# Patient Record
Sex: FEMALE | Race: WHITE | Employment: OTHER | URBAN - METROPOLITAN AREA
[De-identification: names, ages, dates, MRNs, and addresses within clinical notes are randomized per-mention and may not be internally consistent; named-entity substitution may affect disease eponyms.]

---

## 2019-05-06 ENCOUNTER — HOSPITAL ENCOUNTER (EMERGENCY)
Age: 74
Discharge: HOME | End: 2019-05-06
Attending: EMERGENCY MEDICINE
Payer: MEDICARE

## 2019-05-06 VITALS
SYSTOLIC BLOOD PRESSURE: 132 MMHG | OXYGEN SATURATION: 97 % | TEMPERATURE: 97.2 F | DIASTOLIC BLOOD PRESSURE: 64 MMHG | RESPIRATION RATE: 14 BRPM | HEART RATE: 76 BPM

## 2019-05-06 DIAGNOSIS — R41.82 ALTERED MENTAL STATUS, UNSPECIFIED ALTERED MENTAL STATUS TYPE: Primary | ICD-10-CM

## 2019-05-06 PROCEDURE — 99281 EMR DPT VST MAYX REQ PHY/QHP: CPT

## 2019-05-06 NOTE — PROGRESS NOTES
Medication History  [facility]    Patient Name: Julissa Nava 1945     Medication history has been completed by: Claude Macario CPhT    Source(s) of information: Patients spouse and Insurance claims    Primary Care Physician: Sravan Melissa MD     Pharmacy: [facility] [address]    Allergies as of 05/06/2019    (No Known Allergies)        Prior to Admission medications    Medication Sig Start Date End Date Taking?  Authorizing Provider   levothyroxine (SYNTHROID) 75 MCG tablet Take 75 mcg by mouth daily     Historical Provider, MD             Medications removed from list (include reason, ex. noncompliance, medication cost, therapy complete etc.):   · Tylenol no longer taking  · Docusate no longer taking  · Multivitamin no longer taking  · Red yeast rice no longer taking  · Xarelto no longer taking    Other Comments:  · Reviewed and updated med list per patients  as patient has dementia  · Spoke with patients niece who states that patient was taking aricept for her dementia however spouse stopped giving her this medication as he felt it was not working    To my knowledge the above medication history is accurate as of 5/6/2019 6:10 PM.   Claude Macario CPhT   5/6/2019 6:10 PM

## 2019-05-06 NOTE — ED PROVIDER NOTES
Triage Chief Complaint:   Altered Mental Status (Pt alert but not oriented)    False PassYoon Wade is a 66 y.o. female that presents to the ED with alzheimer's disease. Patient primarily under the care of her  who is being admitted to the hospital. She has no complaints. At the bedside she is alert    ROS:  Alert and oriented. unreliable historian secondary to dementia. Past Medical History:   Diagnosis Date    Dementia     Thyroid disease      Past Surgical History:   Procedure Laterality Date    HIP SURGERY Left 06/29/2016    Left hip hemiarthroplasty    THYROIDECTOMY       History reviewed. No pertinent family history.   Social History     Socioeconomic History    Marital status:      Spouse name: Not on file    Number of children: Not on file    Years of education: Not on file    Highest education level: Not on file   Occupational History    Not on file   Social Needs    Financial resource strain: Not on file    Food insecurity:     Worry: Not on file     Inability: Not on file    Transportation needs:     Medical: Not on file     Non-medical: Not on file   Tobacco Use    Smoking status: Never Smoker   Substance and Sexual Activity    Alcohol use: No    Drug use: No    Sexual activity: Not on file   Lifestyle    Physical activity:     Days per week: Not on file     Minutes per session: Not on file    Stress: Not on file   Relationships    Social connections:     Talks on phone: Not on file     Gets together: Not on file     Attends Caodaism service: Not on file     Active member of club or organization: Not on file     Attends meetings of clubs or organizations: Not on file     Relationship status: Not on file    Intimate partner violence:     Fear of current or ex partner: Not on file     Emotionally abused: Not on file     Physically abused: Not on file     Forced sexual activity: Not on file   Other Topics Concern    Not on file   Social History Narrative    Not on file     No current facility-administered medications for this encounter. Current Outpatient Medications   Medication Sig Dispense Refill    levothyroxine (SYNTHROID) 75 MCG tablet Take 75 mcg by mouth daily        No Known Allergies    Nursing Notes Reviewed    Physical Exam:  ED Triage Vitals [05/06/19 1753]   Enc Vitals Group      /64      Pulse 76      Resp 14      Temp 97.2 °F (36.2 °C)      Temp Source Oral      SpO2 97 %      Weight       Height       Head Circumference       Peak Flow       Pain Score       Pain Loc       Pain Edu? Excl. in [address]? My pulse ox interpretation is - normal    General appearance:  No acute distress. Skin:  Warm. Dry. Eye:  Extraocular movements intact. Ears, nose, mouth and throat:  Oral mucosa moist   Neck:  Trachea midline. Extremity:  No swelling. Normal ROM     Heart:  Regular rate and rhythm, normal S1 & S2, no extra heart sounds. Perfusion:  intact  Respiratory:  Lungs clear to auscultation bilaterally. Respirations nonlabored. Abdominal:  Normal bowel sounds. Soft. Nontender. Non distended. Neurological:  Alert. No focal neuro deficits. I have reviewed and interpreted all of the currently available lab results from this visit (if applicable):  No results found for this visit on 05/06/19. Radiographs (if obtained):  [] The following radiograph was interpreted by myself in the absence of a radiologist:   [] Radiologist's Report Reviewed:  No orders to display         EKG (if obtained): (All EKG's are interpreted by myself in the absence of a cardiologist)    Chart review shows recent radiographs:  No results found. MDM:  52-year-old female presenting with ongoing dementia however no new symptoms reported. Given that patient is under the care of her  who is being admitted it is deemed necessary for patient to go to respite care. Patient accepted at [facility].  During her time in the ED she remained stable and in no acute distress. Clinical Impression:  1. Altered mental status, unspecified altered mental status type      Disposition referral (if applicable):  Joyce Melissa MD  [facility]  [phone]    Schedule an appointment as soon as possible for a visit       Disposition medications (if applicable):  New Prescriptions    No medications on file       Comment: Please note this report has been produced using speech recognition software and may contain errors related to that system including errors in grammar, punctuation, and spelling, as well as words and phrases that may be inappropriate. If there are any questions or concerns please feel free to contact the dictating provider for clarification.         Greta Gan MD  05/06/19 8469

## 2019-05-06 NOTE — CARE COORDINATION
CM consult to place pt in respite care while  is being admitted and is her care giver. Pt  is willing to private pay for pt to be in respite care. Call to [facility] respite spoke with Mary Romero [phone], not sure if available room for female will have to check and call back. Call to [facility], [address], she states they do have a bed, facility is locked and can accept pt. Fax # [phone]. Spoke with Madai Almendarez,  is is ok with the cost of $143/day. Update to Dr Alyssa Ordoñez, she signed order for pt to go to [facility]. Call Back to [facility] to update, left message on cell # [phone].  NGUYỄN GARCIA/CM

## 2019-05-18 ENCOUNTER — HOSPITAL ENCOUNTER (OUTPATIENT)
Age: 74
Setting detail: SPECIMEN
Discharge: HOME | End: 2019-05-18
Payer: MEDICARE

## 2019-05-18 PROCEDURE — 87086 URINE CULTURE/COLONY COUNT: CPT

## 2019-05-18 PROCEDURE — 81001 URINALYSIS AUTO W/SCOPE: CPT

## 2019-05-20 LAB
BACTERIA: NEGATIVE /HPF
BILIRUBIN URINE: NEGATIVE MG/DL
BLOOD, URINE: NEGATIVE
CLARITY: CLEAR
COLOR: YELLOW
GLUCOSE, URINE: NEGATIVE MG/DL
KETONES, URINE: NEGATIVE MG/DL
LEUKOCYTE ESTERASE, URINE: NEGATIVE
NITRITE URINE, QUANTITATIVE: NEGATIVE
PH, URINE: 6 (ref 5–8)
PROTEIN UA: NEGATIVE MG/DL
RBC URINE: <1 /HPF (ref 0–6)
SPECIFIC GRAVITY UA: 1.02 (ref 1–1.03)
SQUAMOUS EPITHELIAL: <1 /HPF
TRICHOMONAS: NORMAL /HPF
UROBILINOGEN, URINE: NORMAL MG/DL (ref 0.2–1)
WBC UA: 1 /HPF (ref 0–5)

## 2019-05-21 LAB
CULTURE: ABNORMAL
Lab: ABNORMAL
SPECIMEN: ABNORMAL
TOTAL COLONY COUNT: ABNORMAL

## 2019-07-27 ENCOUNTER — APPOINTMENT (OUTPATIENT)
Dept: GENERAL RADIOLOGY | Age: 74
End: 2019-07-27
Payer: MEDICARE

## 2019-07-27 ENCOUNTER — HOSPITAL ENCOUNTER (EMERGENCY)
Age: 74
Discharge: HOME | End: 2019-07-27
Attending: EMERGENCY MEDICINE
Payer: MEDICARE

## 2019-07-27 ENCOUNTER — APPOINTMENT (OUTPATIENT)
Dept: CT IMAGING | Age: 74
End: 2019-07-27
Payer: MEDICARE

## 2019-07-27 VITALS
RESPIRATION RATE: 24 BRPM | WEIGHT: 170 LBS | HEART RATE: 58 BPM | HEIGHT: 67 IN | SYSTOLIC BLOOD PRESSURE: 112 MMHG | TEMPERATURE: 97.7 F | DIASTOLIC BLOOD PRESSURE: 45 MMHG | BODY MASS INDEX: 26.68 KG/M2 | OXYGEN SATURATION: 99 %

## 2019-07-27 DIAGNOSIS — S59.201A DISPLACED PHYSEAL FRACTURE OF DISTAL END OF RIGHT RADIUS, INITIAL ENCOUNTER: Primary | ICD-10-CM

## 2019-07-27 PROCEDURE — 90715 TDAP VACCINE 7 YRS/> IM: CPT | Performed by: PHYSICIAN ASSISTANT

## 2019-07-27 PROCEDURE — 70450 CT HEAD/BRAIN W/O DYE: CPT

## 2019-07-27 PROCEDURE — 73130 X-RAY EXAM OF HAND: CPT

## 2019-07-27 PROCEDURE — 72125 CT NECK SPINE W/O DYE: CPT

## 2019-07-27 PROCEDURE — 90471 IMMUNIZATION ADMIN: CPT | Performed by: PHYSICIAN ASSISTANT

## 2019-07-27 PROCEDURE — 2500000003 HC RX 250 WO HCPCS: Performed by: PHYSICIAN ASSISTANT

## 2019-07-27 PROCEDURE — 6360000002 HC RX W HCPCS: Performed by: PHYSICIAN ASSISTANT

## 2019-07-27 PROCEDURE — 4500000028 HC INTERMEDIATE PROCEDURE

## 2019-07-27 PROCEDURE — 99284 EMERGENCY DEPT VISIT MOD MDM: CPT

## 2019-07-27 PROCEDURE — 6370000000 HC RX 637 (ALT 250 FOR IP): Performed by: PHYSICIAN ASSISTANT

## 2019-07-27 PROCEDURE — 73110 X-RAY EXAM OF WRIST: CPT

## 2019-07-27 RX ORDER — DIAPER,BRIEF,INFANT-TODD,DISP
EACH MISCELLANEOUS ONCE
Status: COMPLETED | OUTPATIENT
Start: 2019-07-27 | End: 2019-07-27

## 2019-07-27 RX ORDER — HYDROCODONE BITARTRATE AND ACETAMINOPHEN 5; 325 MG/1; MG/1
1 TABLET ORAL ONCE
Status: COMPLETED | OUTPATIENT
Start: 2019-07-27 | End: 2019-07-27

## 2019-07-27 RX ORDER — BUPIVACAINE HYDROCHLORIDE AND EPINEPHRINE 5; .0091 MG/ML; MG/ML
10 INJECTION, SOLUTION DENTAL; INFILTRATION ONCE
Status: COMPLETED | OUTPATIENT
Start: 2019-07-27 | End: 2019-07-27

## 2019-07-27 RX ORDER — HYDROCODONE BITARTRATE AND ACETAMINOPHEN 5; 325 MG/1; MG/1
1 TABLET ORAL EVERY 6 HOURS PRN
Qty: 8 TABLET | Refills: 0 | Status: SHIPPED | OUTPATIENT
Start: 2019-07-27 | End: 2019-07-30

## 2019-07-27 RX ADMIN — HYDROCODONE BITARTRATE AND ACETAMINOPHEN 1 TABLET: 5; 325 TABLET ORAL at 16:07

## 2019-07-27 RX ADMIN — BACITRACIN ZINC 1 G: 500 OINTMENT TOPICAL at 16:07

## 2019-07-27 RX ADMIN — TETANUS TOXOID, REDUCED DIPHTHERIA TOXOID AND ACELLULAR PERTUSSIS VACCINE, ADSORBED 0.5 ML: 5; 2.5; 8; 8; 2.5 SUSPENSION INTRAMUSCULAR at 16:07

## 2019-07-27 RX ADMIN — BUPIVACAINE HYDROCHLORIDE AND EPINEPHRINE BITARTRATE 10 ML: 5; .005 INJECTION, SOLUTION EPIDURAL; INTRACAUDAL; PERINEURAL at 17:30

## 2019-07-27 ASSESSMENT — PAIN SCALES - PAIN ASSESSMENT IN ADVANCED DEMENTIA (PAINAD)
BODYLANGUAGE: 1
BREATHING: 1
CONSOLABILITY: 1
FACIALEXPRESSION: 1
TOTALSCORE: 5
NEGVOCALIZATION: 1

## 2019-07-27 ASSESSMENT — PAIN SCALES - GENERAL
PAINLEVEL_OUTOF10: 8
PAINLEVEL_OUTOF10: 8

## 2019-07-27 NOTE — ED PROVIDER NOTES
Patient Identification  Jason Hassan is a 66 y.o. female    Chief Complaint  Fall (tripped) and Hand Injury (right)      HPI  (History provided by rufina)  This is a 66 y.o. female with history of Alzheimer's who was brought in by  for chief complaint of fall, right hand and wrist injury. Just prior to arrival patient tripped over a parking block and injured her right wrist and hand.  also states that she hit her head but does not think she had a very hard. She did no loss of consciousness. She is obvious deformity to the right wrist and does complain of pain in this area, unable to give me a severity, denies any other areas of pain although history is difficult to obtain secondary to Alzheimer's. She is not on blood thinners. No vomiting. Has been able to ambulate since injury. REVIEW OF SYSTEMS    Partial review of systems performed as noted above, unable to obtain further review of systems secondary to patient's Alzheimer's. See HPI and nursing notes for additional information     I have reviewed the following nursing documentation:  Allergies: No Known Allergies    Past medical history:  has a past medical history of Dementia and Thyroid disease. Past surgical history:  has a past surgical history that includes Thyroidectomy and hip surgery (Left, 06/29/2016). Home medications:   Prior to Admission medications    Medication Sig Start Date End Date Taking? Authorizing Provider   HYDROcodone-acetaminophen (NORCO) 5-325 MG per tablet Take 1 tablet by mouth every 6 hours as needed for Pain for up to 3 days. 7/27/19 7/30/19 Yes Peterson Mora PA-C   levothyroxine (SYNTHROID) 75 MCG tablet Take 75 mcg by mouth daily     Historical Provider, MD       Social history:  reports that she has never smoked. She has never used smokeless tobacco. She reports that she does not drink alcohol or use drugs. Family history:  History reviewed.  No pertinent family small vessel ischemic white matter disease and diffuse cerebral   volume loss. Low-density extra-axial collections bilaterally are most compatible with   chronic subdural hygromas. Labs  No results found for this visit on 07/27/19. MDM  Patient presents for wrist deformity, fall. Found to have displaced impacted right distal radius fracture. Did hit her head, CT head neck shows no abnormalities. Performed hematoma block as noted above the patient was moving for large portions of procedure, unclear how much of anesthetic was placed into hematoma. Attempted mild traction prior to splinting and patient did not tolerate this at all. Patient splinted with fracture as it stands. Will refer to orthopedics. Discussed splint care with . Provided Norco for pain. Discussed splint care and neurovascular checks. Plan is to discharge. Recommended calling orthopedics on Monday for earliest appointment. Assessment and plan discussed with  understands and agrees. Recommended returning patient to ED for any new or worsening symptoms including worsening pain, skin color changes, numbness, weakness. I have independently evaluated this patient. Final Impression  1. Displaced physeal fracture of distal end of right radius, initial encounter        Blood pressure (!) 112/45, pulse 58, temperature 97.7 °F (36.5 °C), temperature source Oral, resp. rate 24, height 5' 7\" (1.702 m), weight 170 lb (77.1 kg), SpO2 99 %. Disposition:  Discharge to home in stable condition. Patient was given scripts for the following medications. I counseled patient how to take these medications. New Prescriptions    HYDROCODONE-ACETAMINOPHEN (NORCO) 5-325 MG PER TABLET    Take 1 tablet by mouth every 6 hours as needed for Pain for up to 3 days. This chart was generated using the [address] [place], [place] [address]  dictation system.  I created this record but it may contain dictation errors given the limitations of

## 2019-07-31 PROBLEM — S52.531A CLOSED COLLES' FRACTURE OF RIGHT RADIUS: Status: ACTIVE | Noted: 2019-07-31

## 2019-07-31 NOTE — PROGRESS NOTES
ORTHOPEDIC NEW PATIENT NOTE      2019    Patient name: Elio Alford  : 1945    CHIEF COMPLAINT  Chief Complaint   Patient presents with    Wrist Pain     right       HPI  The patient was seen and examined. Elio Alford is a 66 y.o. female who presents with complaints of right wrist pain after a trip over parking block causing a fall on outstretched hand. Patient has underlying Alzheimer's dementia. Date of injury 19. She was seen and evaluated in ED. X-rays confirm distal radius fracture, displaced, intra-articular. A hematoma block was performed in ED with reduction and splinting. No post-reduction films obtained. Severity 8/10  Character sharp and dull - patient poor historian,  with patient. He reports she has been removing splint and complains of pain. They are constantly replacing splint. Patient's  desires more permanent fixation. We have discussed treatment options in length and detail, expected recovery and restrictions. The  desires surgery and desires a cast today which the patient cannot remove before surgery. Cast was bivalved. PAST MEDICAL HISTORY  Past Medical History:   Diagnosis Date    Dementia     Thyroid disease        CURRENT MEDICATIONS  Prior to Admission medications    Medication Sig Start Date End Date Taking? Authorizing Provider   donepezil (ARICEPT) 10 MG tablet TAKE 1 TABLET BY MOUTH EVERY DAY AFTER 5MG 19   Historical Provider, MD   [facility] VITAMIN E 1000 units capsule TAKE ONE CAPSULE BY MOUTH EVERY DAY 19   Historical Provider, MD   levothyroxine (SYNTHROID) 75 MCG tablet Take 75 mcg by mouth daily     Historical Provider, MD       ALLERGIES  No Known Allergies    SURGICAL HISTORY  Past Surgical History:   Procedure Laterality Date    HIP SURGERY Left 2016    Left hip hemiarthroplasty    THYROIDECTOMY         FAMILY HISTORY  No family history on file.     SOCIAL HISTORY  Social History     Socioeconomic History    66 y.o. female with right acute displaced distal radius, intraarticular   Alzheimer's Dementia  PLAN   1. Discussed management options right distal radius fracture, operative vs conservative. Would recommend operative management based on alignment. Patient's  agrees as patient is unable to make own medical decisions with advanced Alzheimer's dementia. SURGICAL CONSENT  Diagnosis: right distal radius fracture   Planned Procedure: open reduction internal fixation right wrist    Diagnosis, planned procedure, pertinent risks, benefits, alternatives, potential complications, expectations, outcomes, etc. discussed with patient. Questions answered. The patient desires to proceed. Surgical consent signed. 2. Bivalved short arm cast  3. Non-weightbearing  4. Will work to schedule outpatient procedure.  Goal next week Tuesday 8/6/19  Follow up in approximately 3 weeks post-op        Electronically signed by: Monty Everett PA-C, 8/1/2019 2:24 PM

## 2019-08-01 ENCOUNTER — OFFICE VISIT (OUTPATIENT)
Dept: ORTHOPEDIC SURGERY | Age: 74
End: 2019-08-01
Payer: MEDICARE

## 2019-08-01 VITALS — RESPIRATION RATE: 14 BRPM | HEART RATE: 63 BPM | OXYGEN SATURATION: 98 %

## 2019-08-01 DIAGNOSIS — S52.531A CLOSED COLLES' FRACTURE OF RIGHT RADIUS, INITIAL ENCOUNTER: ICD-10-CM

## 2019-08-01 DIAGNOSIS — S52.531A CLOSED COLLES' FRACTURE OF RIGHT RADIUS, INITIAL ENCOUNTER: Primary | ICD-10-CM

## 2019-08-01 PROCEDURE — 99202 OFFICE O/P NEW SF 15 MIN: CPT | Performed by: PHYSICIAN ASSISTANT

## 2019-08-01 PROCEDURE — 29075 APPL CST ELBW FNGR SHORT ARM: CPT | Performed by: PHYSICIAN ASSISTANT

## 2019-08-01 RX ORDER — DONEPEZIL HYDROCHLORIDE 10 MG/1
10 TABLET, FILM COATED ORAL NIGHTLY
Refills: 11 | COMMUNITY
Start: 2019-05-20 | End: 2020-11-09

## 2019-08-01 RX ORDER — VITAMIN E 1000 UNIT
CAPSULE ORAL
Refills: 11 | COMMUNITY
Start: 2019-05-20 | End: 2020-11-09

## 2019-08-01 NOTE — PROGRESS NOTES
Patient is coming in for her right wrist. DOI: 7/27/19. She tripped and fell. Patient with dementia. She does not have any pain at this time. She has been taking her splint on and off.

## 2019-08-06 ENCOUNTER — ANESTHESIA EVENT (OUTPATIENT)
Dept: OPERATING ROOM | Age: 74
End: 2019-08-06
Payer: MEDICARE

## 2019-08-06 NOTE — ANESTHESIA PRE PROCEDURE
Department of Anesthesiology  Preprocedure Note       Name:  Jaycee Oakley   Age:  66 y.o.  :  1945                                          MRN:  [id number]         Date:  2019      Surgeon: Garima Balbuena):  Angelito De Santiago MD    Procedure: WRIST OPEN REDUCTION INTERNAL FIXATION RIGHT (Right )    Medications prior to admission:   Prior to Admission medications    Medication Sig Start Date End Date Taking? Authorizing Provider   donepezil (ARICEPT) 10 MG tablet Take 10 mg by mouth nightly  19   Historical Provider, MD   [facility] VITAMIN E 1000 units capsule TAKE ONE CAPSULE BY MOUTH EVERY DAY 19   Historical Provider, MD   levothyroxine (SYNTHROID) 75 MCG tablet Take 75 mcg by mouth daily     Historical Provider, MD       Current medications:    No current facility-administered medications for this encounter. Current Outpatient Medications   Medication Sig Dispense Refill    donepezil (ARICEPT) 10 MG tablet Take 10 mg by mouth nightly   11    [facility] VITAMIN E 1000 units capsule TAKE ONE CAPSULE BY MOUTH EVERY DAY  11    levothyroxine (SYNTHROID) 75 MCG tablet Take 75 mcg by mouth daily          Allergies:  No Known Allergies    Problem List:    Patient Active Problem List   Diagnosis Code    [facility] S[facility].009[facility]    Dementia F03.90    Hypothyroid E03.9    Closed Colles' fracture of right radius S52.531A       Past Medical History:        Diagnosis Date    Alzheimer's dementia     Dementia     Thyroid disease     Hypothyroidism    Wears glasses        Past Surgical History:        Procedure Laterality Date    FRACTURE SURGERY Left     Wrist    HIP SURGERY Left 2016    Left hip hemiarthroplasty    THYROIDECTOMY         Social History:    Social History     Tobacco Use    Smoking status: Never Smoker    Smokeless tobacco: Never Used   Substance Use Topics    Alcohol use:  No                                Counseling given: Not Answered      Vital Signs (Current):

## 2019-08-07 ENCOUNTER — HOSPITAL ENCOUNTER (OUTPATIENT)
Age: 74
Setting detail: OUTPATIENT SURGERY
Discharge: HOME | End: 2019-08-07
Attending: ORTHOPAEDIC SURGERY | Admitting: ORTHOPAEDIC SURGERY
Payer: MEDICARE

## 2019-08-07 ENCOUNTER — APPOINTMENT (OUTPATIENT)
Dept: GENERAL RADIOLOGY | Age: 74
End: 2019-08-07
Attending: ORTHOPAEDIC SURGERY
Payer: MEDICARE

## 2019-08-07 ENCOUNTER — ANESTHESIA (OUTPATIENT)
Dept: OPERATING ROOM | Age: 74
End: 2019-08-07
Payer: MEDICARE

## 2019-08-07 VITALS
SYSTOLIC BLOOD PRESSURE: 118 MMHG | DIASTOLIC BLOOD PRESSURE: 70 MMHG | TEMPERATURE: 98.6 F | OXYGEN SATURATION: 100 % | RESPIRATION RATE: 7 BRPM

## 2019-08-07 VITALS
SYSTOLIC BLOOD PRESSURE: 121 MMHG | WEIGHT: 170 LBS | HEIGHT: 67 IN | BODY MASS INDEX: 26.68 KG/M2 | HEART RATE: 59 BPM | TEMPERATURE: 97.8 F | OXYGEN SATURATION: 99 % | DIASTOLIC BLOOD PRESSURE: 67 MMHG | RESPIRATION RATE: 16 BRPM

## 2019-08-07 DIAGNOSIS — S52.531D CLOSED COLLES' FRACTURE OF RIGHT RADIUS WITH ROUTINE HEALING, SUBSEQUENT ENCOUNTER: Primary | ICD-10-CM

## 2019-08-07 LAB
ANION GAP SERPL CALCULATED.3IONS-SCNC: 8 MMOL/L (ref 4–16)
BUN BLDV-MCNC: 18 MG/DL (ref 6–23)
CALCIUM SERPL-MCNC: 9.1 MG/DL (ref 8.3–10.6)
CHLORIDE BLD-SCNC: 102 MMOL/L (ref 99–110)
CO2: 27 MMOL/L (ref 21–32)
CREAT SERPL-MCNC: 0.8 MG/DL (ref 0.6–1.1)
GFR AFRICAN AMERICAN: >60 ML/MIN/1.73M2
GFR NON-AFRICAN AMERICAN: >60 ML/MIN/1.73M2
GLUCOSE BLD-MCNC: 105 MG/DL (ref 70–99)
HCT VFR BLD CALC: 41.7 % (ref 37–47)
HEMOGLOBIN: 13.1 GM/DL (ref 12.5–16)
MCH RBC QN AUTO: 31.3 PG (ref 27–31)
MCHC RBC AUTO-ENTMCNC: 31.4 % (ref 32–36)
MCV RBC AUTO: 99.8 FL (ref 78–100)
PDW BLD-RTO: 14.3 % (ref 11.7–14.9)
PLATELET # BLD: 280 K/CU MM (ref 140–440)
PMV BLD AUTO: 9.9 FL (ref 7.5–11.1)
POTASSIUM SERPL-SCNC: 4.5 MMOL/L (ref 3.5–5.1)
RBC # BLD: 4.18 M/CU MM (ref 4.2–5.4)
SODIUM BLD-SCNC: 137 MMOL/L (ref 135–145)
WBC # BLD: 5 K/CU MM (ref 4–10.5)

## 2019-08-07 PROCEDURE — 6360000002 HC RX W HCPCS: Performed by: ORTHOPAEDIC SURGERY

## 2019-08-07 PROCEDURE — 7100000010 HC PHASE II RECOVERY - FIRST 15 MIN: Performed by: ORTHOPAEDIC SURGERY

## 2019-08-07 PROCEDURE — 6360000002 HC RX W HCPCS: Performed by: NURSE ANESTHETIST, CERTIFIED REGISTERED

## 2019-08-07 PROCEDURE — 7100000001 HC PACU RECOVERY - ADDTL 15 MIN: Performed by: ORTHOPAEDIC SURGERY

## 2019-08-07 PROCEDURE — 2720000010 HC SURG SUPPLY STERILE: Performed by: ORTHOPAEDIC SURGERY

## 2019-08-07 PROCEDURE — 2580000003 HC RX 258: Performed by: ANESTHESIOLOGY

## 2019-08-07 PROCEDURE — 25607 OPTX DST RD XARTC FX/EPI SEP: CPT | Performed by: ORTHOPAEDIC SURGERY

## 2019-08-07 PROCEDURE — 7100000000 HC PACU RECOVERY - FIRST 15 MIN: Performed by: ORTHOPAEDIC SURGERY

## 2019-08-07 PROCEDURE — 3700000001 HC ADD 15 MINUTES (ANESTHESIA): Performed by: ORTHOPAEDIC SURGERY

## 2019-08-07 PROCEDURE — 76942 ECHO GUIDE FOR BIOPSY: CPT | Performed by: ANESTHESIOLOGY

## 2019-08-07 PROCEDURE — 3700000000 HC ANESTHESIA ATTENDED CARE: Performed by: ORTHOPAEDIC SURGERY

## 2019-08-07 PROCEDURE — 76000 FLUOROSCOPY <1 HR PHYS/QHP: CPT

## 2019-08-07 PROCEDURE — 80048 BASIC METABOLIC PNL TOTAL CA: CPT

## 2019-08-07 PROCEDURE — 3600000014 HC SURGERY LEVEL 4 ADDTL 15MIN: Performed by: ORTHOPAEDIC SURGERY

## 2019-08-07 PROCEDURE — 2500000003 HC RX 250 WO HCPCS: Performed by: NURSE ANESTHETIST, CERTIFIED REGISTERED

## 2019-08-07 PROCEDURE — 85027 COMPLETE CBC AUTOMATED: CPT

## 2019-08-07 PROCEDURE — 25607 OPTX DST RD XARTC FX/EPI SEP: CPT | Performed by: PHYSICIAN ASSISTANT

## 2019-08-07 PROCEDURE — 2709999900 HC NON-CHARGEABLE SUPPLY: Performed by: ORTHOPAEDIC SURGERY

## 2019-08-07 PROCEDURE — C1713 ANCHOR/SCREW BN/BN,TIS/BN: HCPCS | Performed by: ORTHOPAEDIC SURGERY

## 2019-08-07 PROCEDURE — 7100000011 HC PHASE II RECOVERY - ADDTL 15 MIN: Performed by: ORTHOPAEDIC SURGERY

## 2019-08-07 PROCEDURE — 3600000004 HC SURGERY LEVEL 4 BASE: Performed by: ORTHOPAEDIC SURGERY

## 2019-08-07 DEVICE — SCREW BNE L20MM DIA2.4MM DST RAD VOLAR S STL ST VAR ANG LOK: Type: IMPLANTABLE DEVICE | Site: WRIST | Status: FUNCTIONAL

## 2019-08-07 DEVICE — SCREW BNE L10MM DIA2.4MM S STL ST VAR ANG LOK FULL THRD T8: Type: IMPLANTABLE DEVICE | Site: WRIST | Status: FUNCTIONAL

## 2019-08-07 DEVICE — SCREW BNE L18MM DIA2.4MM DST RAD VOLAR S STL ST VAR ANG LOK: Type: IMPLANTABLE DEVICE | Site: WRIST | Status: FUNCTIONAL

## 2019-08-07 DEVICE — PLATE BNE W22XL54MM STD 6X3 H ST R DST RAD VOLAR S STL VAR: Type: IMPLANTABLE DEVICE | Site: WRIST | Status: FUNCTIONAL

## 2019-08-07 DEVICE — SCREW BNE L12MM DIA2.7MM CORT S STL ST T8 STARDRV RECESS: Type: IMPLANTABLE DEVICE | Site: WRIST | Status: FUNCTIONAL

## 2019-08-07 RX ORDER — CEFAZOLIN SODIUM 2 G/100ML
2 INJECTION, SOLUTION INTRAVENOUS EVERY 8 HOURS
Status: DISCONTINUED | OUTPATIENT
Start: 2019-08-07 | End: 2019-08-07 | Stop reason: HOSPADM

## 2019-08-07 RX ORDER — LIDOCAINE HYDROCHLORIDE 20 MG/ML
INJECTION, SOLUTION INFILTRATION; PERINEURAL PRN
Status: DISCONTINUED | OUTPATIENT
Start: 2019-08-07 | End: 2019-08-07 | Stop reason: SDUPTHER

## 2019-08-07 RX ORDER — HYDROCODONE BITARTRATE AND ACETAMINOPHEN 5; 325 MG/1; MG/1
1 TABLET ORAL EVERY 6 HOURS PRN
Qty: 28 TABLET | Refills: 0 | Status: SHIPPED | OUTPATIENT
Start: 2019-08-07 | End: 2019-08-13

## 2019-08-07 RX ORDER — PROPOFOL 10 MG/ML
INJECTION, EMULSION INTRAVENOUS CONTINUOUS PRN
Status: DISCONTINUED | OUTPATIENT
Start: 2019-08-07 | End: 2019-08-07 | Stop reason: SDUPTHER

## 2019-08-07 RX ORDER — HYDROMORPHONE HCL 110MG/55ML
0.5 PATIENT CONTROLLED ANALGESIA SYRINGE INTRAVENOUS EVERY 5 MIN PRN
Status: DISCONTINUED | OUTPATIENT
Start: 2019-08-07 | End: 2019-08-07 | Stop reason: HOSPADM

## 2019-08-07 RX ORDER — CEFAZOLIN SODIUM 2 G/100ML
INJECTION, SOLUTION INTRAVENOUS
Status: COMPLETED
Start: 2019-08-07 | End: 2019-08-07

## 2019-08-07 RX ORDER — HYDRALAZINE HYDROCHLORIDE 20 MG/ML
5 INJECTION INTRAMUSCULAR; INTRAVENOUS EVERY 10 MIN PRN
Status: DISCONTINUED | OUTPATIENT
Start: 2019-08-07 | End: 2019-08-07 | Stop reason: HOSPADM

## 2019-08-07 RX ORDER — ONDANSETRON 2 MG/ML
4 INJECTION INTRAMUSCULAR; INTRAVENOUS
Status: DISCONTINUED | OUTPATIENT
Start: 2019-08-07 | End: 2019-08-07 | Stop reason: HOSPADM

## 2019-08-07 RX ORDER — SODIUM CHLORIDE, SODIUM LACTATE, POTASSIUM CHLORIDE, CALCIUM CHLORIDE 600; 310; 30; 20 MG/100ML; MG/100ML; MG/100ML; MG/100ML
INJECTION, SOLUTION INTRAVENOUS CONTINUOUS
Status: DISCONTINUED | OUTPATIENT
Start: 2019-08-07 | End: 2019-08-07 | Stop reason: HOSPADM

## 2019-08-07 RX ORDER — FENTANYL CITRATE 50 UG/ML
25 INJECTION, SOLUTION INTRAMUSCULAR; INTRAVENOUS EVERY 5 MIN PRN
Status: DISCONTINUED | OUTPATIENT
Start: 2019-08-07 | End: 2019-08-07 | Stop reason: HOSPADM

## 2019-08-07 RX ORDER — LABETALOL 20 MG/4 ML (5 MG/ML) INTRAVENOUS SYRINGE
5 EVERY 10 MIN PRN
Status: DISCONTINUED | OUTPATIENT
Start: 2019-08-07 | End: 2019-08-07 | Stop reason: HOSPADM

## 2019-08-07 RX ORDER — ROPIVACAINE HYDROCHLORIDE 5 MG/ML
INJECTION, SOLUTION EPIDURAL; INFILTRATION; PERINEURAL PRN
Status: DISCONTINUED | OUTPATIENT
Start: 2019-08-07 | End: 2019-08-07 | Stop reason: SDUPTHER

## 2019-08-07 RX ORDER — SODIUM CHLORIDE, SODIUM LACTATE, POTASSIUM CHLORIDE, CALCIUM CHLORIDE 600; 310; 30; 20 MG/100ML; MG/100ML; MG/100ML; MG/100ML
INJECTION, SOLUTION INTRAVENOUS
Status: COMPLETED
Start: 2019-08-07 | End: 2019-08-07

## 2019-08-07 RX ORDER — HYDROCODONE BITARTRATE AND ACETAMINOPHEN 5; 325 MG/1; MG/1
1 TABLET ORAL EVERY 6 HOURS PRN
Status: ON HOLD | COMMUNITY
End: 2019-08-07 | Stop reason: SDUPTHER

## 2019-08-07 RX ORDER — FENTANYL CITRATE 50 UG/ML
INJECTION, SOLUTION INTRAMUSCULAR; INTRAVENOUS PRN
Status: DISCONTINUED | OUTPATIENT
Start: 2019-08-07 | End: 2019-08-07 | Stop reason: SDUPTHER

## 2019-08-07 RX ORDER — PROPOFOL 10 MG/ML
INJECTION, EMULSION INTRAVENOUS PRN
Status: DISCONTINUED | OUTPATIENT
Start: 2019-08-07 | End: 2019-08-07 | Stop reason: SDUPTHER

## 2019-08-07 RX ADMIN — PROPOFOL 30 MG: 10 INJECTION, EMULSION INTRAVENOUS at 10:06

## 2019-08-07 RX ADMIN — PHENYLEPHRINE HYDROCHLORIDE 50 MCG: 10 INJECTION INTRAVENOUS at 10:20

## 2019-08-07 RX ADMIN — PHENYLEPHRINE HYDROCHLORIDE 50 MCG: 10 INJECTION INTRAVENOUS at 10:17

## 2019-08-07 RX ADMIN — PROPOFOL 100 MG: 10 INJECTION, EMULSION INTRAVENOUS at 09:57

## 2019-08-07 RX ADMIN — LIDOCAINE HYDROCHLORIDE 20 MG: 20 INJECTION, SOLUTION INFILTRATION; PERINEURAL at 09:57

## 2019-08-07 RX ADMIN — PROPOFOL 150 MCG/KG/MIN: 10 INJECTION, EMULSION INTRAVENOUS at 10:04

## 2019-08-07 RX ADMIN — FENTANYL CITRATE 50 MCG: 50 INJECTION INTRAMUSCULAR; INTRAVENOUS at 09:45

## 2019-08-07 RX ADMIN — PROPOFOL 20 MG: 10 INJECTION, EMULSION INTRAVENOUS at 10:00

## 2019-08-07 RX ADMIN — PHENYLEPHRINE HYDROCHLORIDE 100 MCG: 10 INJECTION INTRAVENOUS at 10:26

## 2019-08-07 RX ADMIN — PROPOFOL 20 MG: 10 INJECTION, EMULSION INTRAVENOUS at 10:03

## 2019-08-07 RX ADMIN — SODIUM CHLORIDE, POTASSIUM CHLORIDE, SODIUM LACTATE AND CALCIUM CHLORIDE: 600; 310; 30; 20 INJECTION, SOLUTION INTRAVENOUS at 09:54

## 2019-08-07 RX ADMIN — PHENYLEPHRINE HYDROCHLORIDE 100 MCG: 10 INJECTION INTRAVENOUS at 10:31

## 2019-08-07 RX ADMIN — ROPIVACAINE HYDROCHLORIDE 25 ML: 5 INJECTION, SOLUTION EPIDURAL; INFILTRATION; PERINEURAL at 09:50

## 2019-08-07 RX ADMIN — PROPOFOL 30 MG: 10 INJECTION, EMULSION INTRAVENOUS at 10:48

## 2019-08-07 RX ADMIN — PROPOFOL 30 MG: 10 INJECTION, EMULSION INTRAVENOUS at 10:51

## 2019-08-07 RX ADMIN — CEFAZOLIN SODIUM 2 G: 2 INJECTION, SOLUTION INTRAVENOUS at 10:02

## 2019-08-07 ASSESSMENT — PULMONARY FUNCTION TESTS
PIF_VALUE: 4
PIF_VALUE: 4
PIF_VALUE: 3
PIF_VALUE: 4
PIF_VALUE: 3
PIF_VALUE: 4
PIF_VALUE: 3
PIF_VALUE: 17
PIF_VALUE: 3
PIF_VALUE: 2
PIF_VALUE: 4
PIF_VALUE: 3
PIF_VALUE: 4
PIF_VALUE: 1
PIF_VALUE: 3
PIF_VALUE: 4
PIF_VALUE: 3
PIF_VALUE: 4
PIF_VALUE: 11
PIF_VALUE: 3
PIF_VALUE: 4
PIF_VALUE: 3
PIF_VALUE: 4
PIF_VALUE: 23
PIF_VALUE: 3
PIF_VALUE: 3
PIF_VALUE: 1
PIF_VALUE: 3
PIF_VALUE: 4
PIF_VALUE: 3
PIF_VALUE: 1
PIF_VALUE: 3
PIF_VALUE: 4
PIF_VALUE: 7
PIF_VALUE: 19
PIF_VALUE: 4
PIF_VALUE: 3
PIF_VALUE: 4
PIF_VALUE: 4
PIF_VALUE: 3

## 2019-08-07 ASSESSMENT — PAIN SCALES - GENERAL
PAINLEVEL_OUTOF10: 0

## 2019-08-07 ASSESSMENT — PAIN - FUNCTIONAL ASSESSMENT: PAIN_FUNCTIONAL_ASSESSMENT: 0-10

## 2019-08-07 NOTE — H&P
ORTHOPEDIC HISTORY & PHYSICAL      2019    Patient name: Jaycee Oakley  : 1945    CHIEF COMPLAINT  No chief complaint on file. HPI  The patient was seen and examined. Jaycee Oakley is a 66 y.o. female who presents after trip and fall with right wrist fracture. She has continued to remove splint numerous times. She is heavily right hand dominant per . We discussed risks and benefits related to operative and nonoperative treatment. PAST MEDICAL HISTORY  Past Medical History:   Diagnosis Date    Alzheimer's dementia     Dementia     Thyroid disease     Hypothyroidism    Wears glasses        CURRENT MEDICATIONS  Prior to Admission medications    Medication Sig Start Date End Date Taking? Authorizing Provider   HYDROcodone-acetaminophen (NORCO) 5-325 MG per tablet Take 1 tablet by mouth every 6 hours as needed for Pain. Yes Historical Provider, MD   [facility] VITAMIN E 1000 units capsule TAKE ONE CAPSULE BY MOUTH EVERY DAY 19  Yes Historical Provider, MD   donepezil (ARICEPT) 10 MG tablet Take 10 mg by mouth nightly  19   Historical Provider, MD   levothyroxine (SYNTHROID) 75 MCG tablet Take 75 mcg by mouth daily     Historical Provider, MD       ALLERGIES  No Known Allergies    SURGICAL HISTORY  Past Surgical History:   Procedure Laterality Date    FRACTURE SURGERY Left     Wrist    HIP SURGERY Left 2016    Left hip hemiarthroplasty    THYROIDECTOMY         FAMILY HISTORY  History reviewed. No pertinent family history.     SOCIAL HISTORY  Social History     Socioeconomic History    Marital status:      Spouse name: None    Number of children: None    Years of education: None    Highest education level: None   Occupational History    None   Social Needs    Financial resource strain: None    Food insecurity:     Worry: None     Inability: None    Transportation needs:     Medical: None     Non-medical: None   Tobacco Use    Smoking status:

## 2019-08-07 NOTE — OP NOTE
Exsanguinated  the limb and inflated the tourniquet. Total tourniquet time for the  case is approximately 35 minutes. We proceeded with a volar approach to  the wrist.  We used FCR sheath, protected her radial artery, elevated up  the pronator, and she had significantly impacted distal radius. We  mobilized that fragment distally and tried to restore the length of the  radius in volar tilt. She had normal dorsal comminution. This was  mobilized and reduced, got an improved alignment and pinned it in place. I then placed a standard three-hole volar locking plate with a kickstand  screw proximally, secured it to the shaft, and then I placed the four  distal locking screws, removed that kickstand screw, brought the plate  down to bone restoring the volar tilt to about neutral.  I then secured  it with two more screws proximally, two more locking screws distally. We had a total of six distal locking screws and three cortical screws  proximally. The initial screw, which was long, after bringing the plate  down to bone, was exchanged for a shorter screw. Final x-rays were  taken. Wounds were irrigated and closed with 3-0 Vicryl and 3-0 nylon. Xeroform, 4x4's, Webril, and Ace wrap were applied. We then applied an  ExoSym brace that was previously applied with the strapping mechanism. She has been unable to remove it. Anesthesia was withdrawn and she was  taken to postanesthesia care unit in stable condition. Crista Cruz PA-C was present and assisted throughout the case.          Ludwig Swenson    D: 08/07/2019 11:17:46       T: 08/07/2019 12:44:30     [email or web address]  Job#: [id number]     Doc#: [id number]    CC:

## 2019-08-07 NOTE — BRIEF OP NOTE
Brief Postoperative Note  ______________________________________________________________    Patient: Elio Alford  YOB: 1945  MRN: [id number]  Date of Procedure: 8/7/2019    Pre-Op Diagnosis: distal radius fx    Post-Op Diagnosis: Same       Procedure(s):  WRIST OPEN REDUCTION INTERNAL FIXATION RIGHT    Anesthesia: General, TIVA    Surgeon(s):  Margarito Damon MD    Assistant: Sharon Sung PA-C    Estimated Blood Loss (mL): less than 50     Complications: None    Specimens:   * No specimens in log *    Implants:  Implant Name Type Inv. Item Serial No.  Lot No. LRB No. Used   PLATE DISTL RAD HITESH ANGL RT 2.4X54MM ST Screw/Plate/Nail/Wili PLATE DISTL RAD HITESH ANGL RT 2.4X54MM ST  SYNTHES [id number] Right 1   SCREW 2.4MM VA LK STRDRV 10MM Screw/Plate/Nail/Wili SCREW 2.4MM VA LK STRDRV 10MM  SYNTHES  Right 1   SCREW LK HITESH ANGL 2.4X18MM Screw/Plate/Nail/Wili SCREW LK HITESH ANGL 2.4X18MM  SYNTHES  Right 2   SCREW LK HITESH ANGL 2.4X20MM Screw/Plate/Nail/Wili SCREW LK HITESH ANGL 2.4X20MM  SYNTHES  Right 2   SCREW CORTCL LK SLFTP T8 2.7X12MM Screw/Plate/Nail/Wili SCREW CORTCL LK SLFTP T8 2.7X12MM  SYNTHES  Right 3         Drains: * No LDAs found *    Findings: right distal radius fracture    PLAN:  1.) non-weight bearing right wrist  2.) Continue right wrist brace  3.) OK to change dressing in 3 days.   Keep incision covered until follow up  4.) Follow up in 1 week     Saravanan Marcial PA-C  Date: 8/7/2019  Time: 11:11 AM

## 2019-08-13 ENCOUNTER — OFFICE VISIT (OUTPATIENT)
Dept: ORTHOPEDIC SURGERY | Age: 74
End: 2019-08-13

## 2019-08-13 VITALS — RESPIRATION RATE: 14 BRPM | OXYGEN SATURATION: 98 % | TEMPERATURE: 98 F | HEART RATE: 63 BPM

## 2019-08-13 DIAGNOSIS — Z98.890 S/P ORIF (OPEN REDUCTION INTERNAL FIXATION) FRACTURE: Primary | ICD-10-CM

## 2019-08-13 DIAGNOSIS — Z87.81 S/P ORIF (OPEN REDUCTION INTERNAL FIXATION) FRACTURE: Primary | ICD-10-CM

## 2019-08-13 PROCEDURE — 99024 POSTOP FOLLOW-UP VISIT: CPT | Performed by: PHYSICIAN ASSISTANT

## 2019-08-13 NOTE — PROGRESS NOTES
ORTHOPEDIC PROGRESS NOTE    2019    Patient name: Elio Salazar  : 1945    Chief Complaint   Patient presents with    Post-Op Check     right wrist open reduction internal fixation 19       SUBJECTIVE   The patient was seen and examined. Elio Salazar is a 66 y.o. female who presents today for postoperative appointment s/p ORIF right wrist. Date if surgery 19. Patient is here today with brace. Her splint was falling off so she already has EXOS brace. Denies concerns. Family with her today for appointment. Hx dementia. OBJECTIVE     Vitals:    19 1435   Pulse: 63   Resp: 14   Temp: 98 °F (36.7 °C)   SpO2: 98%       Physical Exam:   GEN: Alert. NAD. MS: RUE- SILT; CR <2 sec; incision intact; no sign of infection; moderate swelling; no redness/warmth/erythema; NV intact; able to wiggle all fingers and thumb; comp soft. X-rays: no xrays today in office. ASSESSMENT     Chief Complaint   Patient presents with    Post-Op Check     right wrist open reduction internal fixation 19     1 week postop   PLAN     1. Activity, PT/OT: continue brace; no weightbearing right wrist.   2. Sutures kept in place. Will remove at next appointment. 3. Remove brace daily for shower and skin checks. Call if any sign of infection or concerns. 4. Follow up: 1-2 weeks.     Electronically signed by:Lyndsey Katheryne Carrel, 2019 2:49 PM

## 2019-08-26 NOTE — PROGRESS NOTES
ORTHOPEDIC PROGRESS NOTE    2019    Patient name: Dandy Latif  : 1945    Chief Complaint   Patient presents with    Post-Op Check     s/p right wrist ORIF  19 suture removal       SUBJECTIVE   The patient was seen and examined. Dandy Latif is a 66 y.o. female who presents today for postoperative appointment s/p ORIF right wrist. Date if surgery 19. John Hartman follows up doing well she is demented but minimizes complaints families with her today she has been using her wrist brace    OBJECTIVE     Vitals:    19 1353   Resp: 16       Physical Exam:   GEN: Alert. NAD. MS: RUE- SILT; CR <2 sec; incision intact; no sign of infection; moderate swelling; no redness/warmth/erythema; NV intact; able to wiggle all fingers and thumb; comp soft. Incisions clean dry and intact no signs of infection  X-rays: no xrays today in office. ASSESSMENT     Chief Complaint   Patient presents with    Post-Op Check     s/p right wrist ORIF  19 suture removal     3 weeks postop   PLAN     1. Activity, PT/OT: continue brace; no weightbearing right wrist; okay for wrist ROM  2. Sutures removed in office today. 3. Remove brace daily for shower and skin checks. Call if any sign of infection or concerns.    4. Follow up: 3-4 weeks for repeat exam and x-ray    Electronically signed by:Fred Andrea MD, 2019 2:19 PM

## 2019-08-27 ENCOUNTER — OFFICE VISIT (OUTPATIENT)
Dept: ORTHOPEDIC SURGERY | Age: 74
End: 2019-08-27

## 2019-08-27 VITALS — HEIGHT: 67 IN | BODY MASS INDEX: 24.33 KG/M2 | RESPIRATION RATE: 16 BRPM | WEIGHT: 155 LBS

## 2019-08-27 DIAGNOSIS — Z87.81 S/P ORIF (OPEN REDUCTION INTERNAL FIXATION) FRACTURE: Primary | ICD-10-CM

## 2019-08-27 DIAGNOSIS — Z98.890 S/P ORIF (OPEN REDUCTION INTERNAL FIXATION) FRACTURE: Primary | ICD-10-CM

## 2019-08-27 PROCEDURE — 99024 POSTOP FOLLOW-UP VISIT: CPT | Performed by: ORTHOPAEDIC SURGERY

## 2019-08-27 NOTE — PATIENT INSTRUCTIONS
1. Activity, PT/OT: continue brace; no weightbearing right wrist; okay for wrist ROM  2. Sutures removed in office today. 3. Remove EXOS brace daily for shower and skin checks. Call if any sign of infection or concerns.    4. Follow up: 3-4 weeks for repeat exam and x-ray

## 2019-08-27 NOTE — PROGRESS NOTES
Radiology Report    Name: Jody Jimenez  YOB: 1945  Procedure: 3 views right wrist  Date: 8/27/2019  Comparison: OR films  Reason for X-ray:   Patient Active Problem List   Diagnosis    Hip fracture ([address]    Dementia    Hypothyroid    Closed Colles' fracture of right radius      Reading: 3 views of the right wrist are reviewed show hardware in place no signs of hardware loosening or failure stable fixation right distal radius  Impression: Stable alignment right distal radius with fixation    Electronically signed by: Delia Cesar MD, 8/27/2019 2:20 PM

## 2019-09-24 ENCOUNTER — OFFICE VISIT (OUTPATIENT)
Dept: ORTHOPEDIC SURGERY | Age: 74
End: 2019-09-24

## 2019-09-24 VITALS — HEART RATE: 82 BPM | WEIGHT: 161 LBS | OXYGEN SATURATION: 96 % | HEIGHT: 66 IN | BODY MASS INDEX: 25.88 KG/M2

## 2019-09-24 DIAGNOSIS — S52.531D CLOSED COLLES' FRACTURE OF RIGHT RADIUS WITH ROUTINE HEALING, SUBSEQUENT ENCOUNTER: ICD-10-CM

## 2019-09-24 PROCEDURE — 99024 POSTOP FOLLOW-UP VISIT: CPT | Performed by: ORTHOPAEDIC SURGERY

## 2020-11-01 ENCOUNTER — HOSPITAL ENCOUNTER (EMERGENCY)
Age: 75
Discharge: HOME | End: 2020-11-01
Payer: MEDICARE

## 2020-11-01 ENCOUNTER — APPOINTMENT (OUTPATIENT)
Dept: CT IMAGING | Age: 75
End: 2020-11-01
Payer: MEDICARE

## 2020-11-01 ENCOUNTER — APPOINTMENT (OUTPATIENT)
Dept: GENERAL RADIOLOGY | Age: 75
End: 2020-11-01
Payer: MEDICARE

## 2020-11-01 VITALS
RESPIRATION RATE: 18 BRPM | OXYGEN SATURATION: 98 % | SYSTOLIC BLOOD PRESSURE: 147 MMHG | HEART RATE: 92 BPM | BODY MASS INDEX: 24.11 KG/M2 | DIASTOLIC BLOOD PRESSURE: 72 MMHG | HEIGHT: 66 IN | WEIGHT: 150 LBS

## 2020-11-01 PROCEDURE — 70486 CT MAXILLOFACIAL W/O DYE: CPT

## 2020-11-01 PROCEDURE — 71045 X-RAY EXAM CHEST 1 VIEW: CPT

## 2020-11-01 PROCEDURE — 72125 CT NECK SPINE W/O DYE: CPT

## 2020-11-01 PROCEDURE — 96374 THER/PROPH/DIAG INJ IV PUSH: CPT

## 2020-11-01 PROCEDURE — 72170 X-RAY EXAM OF PELVIS: CPT

## 2020-11-01 PROCEDURE — 6360000002 HC RX W HCPCS: Performed by: PHYSICIAN ASSISTANT

## 2020-11-01 PROCEDURE — 70450 CT HEAD/BRAIN W/O DYE: CPT

## 2020-11-01 PROCEDURE — 99284 EMERGENCY DEPT VISIT MOD MDM: CPT

## 2020-11-01 RX ORDER — LORAZEPAM 2 MG/ML
1 INJECTION INTRAMUSCULAR ONCE
Status: COMPLETED | OUTPATIENT
Start: 2020-11-01 | End: 2020-11-01

## 2020-11-01 RX ADMIN — LORAZEPAM 1 MG: 2 INJECTION INTRAMUSCULAR; INTRAVENOUS at 15:39

## 2020-11-01 NOTE — ED NOTES
Patient has returned to her room from [address]     Liborio Vu RN  11/01/20 1989

## 2020-11-01 NOTE — ED NOTES
Patient attempting to climb out of the bed.  Patient was able to be redirected to lay back down     Cass Bermeo RN  11/01/20 [id number]
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
## 2020-11-01 NOTE — ED TRIAGE NOTES
Pt presents to ED by EMS from [address] [place] [address] for a fall. Pt tripped and hit head on a table. Staff denies LOC. Staff reports pt has been more lethargic since fall.  Pt has baseline dementia
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
## 2020-11-02 NOTE — ED NOTES
Pt transported to nursing home facility via Med trans at [address] [place] [address], [address] [place] [address]  11/01/20 0211

## 2020-11-02 NOTE — ED NOTES
1907 called [facility] for transportation back to [facility]. ETA 2130.      Hoa Shannon  11/01/20 1907

## 2020-11-09 ENCOUNTER — HOSPITAL ENCOUNTER (EMERGENCY)
Age: 75
Discharge: HOME | End: 2020-11-09
Payer: MEDICARE

## 2020-11-09 ENCOUNTER — APPOINTMENT (OUTPATIENT)
Dept: GENERAL RADIOLOGY | Age: 75
End: 2020-11-09
Payer: MEDICARE

## 2020-11-09 ENCOUNTER — APPOINTMENT (OUTPATIENT)
Dept: CT IMAGING | Age: 75
End: 2020-11-09
Payer: MEDICARE

## 2020-11-09 VITALS
SYSTOLIC BLOOD PRESSURE: 107 MMHG | DIASTOLIC BLOOD PRESSURE: 66 MMHG | OXYGEN SATURATION: 98 % | HEIGHT: 66 IN | HEART RATE: 86 BPM | RESPIRATION RATE: 21 BRPM | TEMPERATURE: 97.9 F | BODY MASS INDEX: 24.11 KG/M2 | WEIGHT: 150 LBS

## 2020-11-09 LAB
ALBUMIN SERPL-MCNC: 3.6 GM/DL (ref 3.4–5)
ALP BLD-CCNC: 90 IU/L (ref 40–129)
ALT SERPL-CCNC: 18 U/L (ref 10–40)
ANION GAP SERPL CALCULATED.3IONS-SCNC: 12 MMOL/L (ref 4–16)
AST SERPL-CCNC: 25 IU/L (ref 15–37)
BASOPHILS ABSOLUTE: 0 K/CU MM
BASOPHILS RELATIVE PERCENT: 0.3 % (ref 0–1)
BILIRUB SERPL-MCNC: 0.4 MG/DL (ref 0–1)
BUN BLDV-MCNC: 20 MG/DL (ref 6–23)
CALCIUM SERPL-MCNC: 9.2 MG/DL (ref 8.3–10.6)
CHLORIDE BLD-SCNC: 102 MMOL/L (ref 99–110)
CO2: 23 MMOL/L (ref 21–32)
CREAT SERPL-MCNC: 0.8 MG/DL (ref 0.6–1.1)
DIFFERENTIAL TYPE: ABNORMAL
EOSINOPHILS ABSOLUTE: 0 K/CU MM
EOSINOPHILS RELATIVE PERCENT: 0.1 % (ref 0–3)
GFR AFRICAN AMERICAN: >60 ML/MIN/1.73M2
GFR NON-AFRICAN AMERICAN: >60 ML/MIN/1.73M2
GLUCOSE BLD-MCNC: 110 MG/DL (ref 70–99)
HCT VFR BLD CALC: 46.1 % (ref 37–47)
HEMOGLOBIN: 14.7 GM/DL (ref 12.5–16)
IMMATURE NEUTROPHIL %: 0.3 % (ref 0–0.43)
LACTATE: 1.1 MMOL/L (ref 0.4–2)
LYMPHOCYTES ABSOLUTE: 0.7 K/CU MM
LYMPHOCYTES RELATIVE PERCENT: 5.1 % (ref 24–44)
MCH RBC QN AUTO: 31.3 PG (ref 27–31)
MCHC RBC AUTO-ENTMCNC: 31.9 % (ref 32–36)
MCV RBC AUTO: 98.3 FL (ref 78–100)
MONOCYTES ABSOLUTE: 0.4 K/CU MM
MONOCYTES RELATIVE PERCENT: 3.3 % (ref 0–4)
NUCLEATED RBC %: 0 %
PDW BLD-RTO: 13.9 % (ref 11.7–14.9)
PLATELET # BLD: 277 K/CU MM (ref 140–440)
PMV BLD AUTO: 9.6 FL (ref 7.5–11.1)
POTASSIUM SERPL-SCNC: 4.6 MMOL/L (ref 3.5–5.1)
RBC # BLD: 4.69 M/CU MM (ref 4.2–5.4)
SEGMENTED NEUTROPHILS ABSOLUTE COUNT: 11.7 K/CU MM
SEGMENTED NEUTROPHILS RELATIVE PERCENT: 90.9 % (ref 36–66)
SODIUM BLD-SCNC: 137 MMOL/L (ref 135–145)
TOTAL IMMATURE NEUTOROPHIL: 0.04 K/CU MM
TOTAL NUCLEATED RBC: 0 K/CU MM
TOTAL PROTEIN: 6.6 GM/DL (ref 6.4–8.2)
TROPONIN T: <0.01 NG/ML
WBC # BLD: 12.9 K/CU MM (ref 4–10.5)

## 2020-11-09 PROCEDURE — 85025 COMPLETE CBC W/AUTO DIFF WBC: CPT

## 2020-11-09 PROCEDURE — 71045 X-RAY EXAM CHEST 1 VIEW: CPT

## 2020-11-09 PROCEDURE — 70450 CT HEAD/BRAIN W/O DYE: CPT

## 2020-11-09 PROCEDURE — 93010 ELECTROCARDIOGRAM REPORT: CPT | Performed by: INTERNAL MEDICINE

## 2020-11-09 PROCEDURE — 83605 ASSAY OF LACTIC ACID: CPT

## 2020-11-09 PROCEDURE — 2580000003 HC RX 258: Performed by: PHYSICIAN ASSISTANT

## 2020-11-09 PROCEDURE — 84484 ASSAY OF TROPONIN QUANT: CPT

## 2020-11-09 PROCEDURE — 80053 COMPREHEN METABOLIC PANEL: CPT

## 2020-11-09 PROCEDURE — 93005 ELECTROCARDIOGRAM TRACING: CPT | Performed by: PHYSICIAN ASSISTANT

## 2020-11-09 PROCEDURE — 99285 EMERGENCY DEPT VISIT HI MDM: CPT

## 2020-11-09 RX ORDER — 0.9 % SODIUM CHLORIDE 0.9 %
1000 INTRAVENOUS SOLUTION INTRAVENOUS ONCE
Status: DISCONTINUED | OUTPATIENT
Start: 2020-11-09 | End: 2020-11-09 | Stop reason: HOSPADM

## 2020-11-09 RX ORDER — 0.9 % SODIUM CHLORIDE 0.9 %
1000 INTRAVENOUS SOLUTION INTRAVENOUS ONCE
Status: COMPLETED | OUTPATIENT
Start: 2020-11-09 | End: 2020-11-09

## 2020-11-09 RX ADMIN — SODIUM CHLORIDE 1000 ML: 9 INJECTION, SOLUTION INTRAVENOUS at 00:00

## 2020-11-09 NOTE — PROGRESS NOTES
Medication History  [facility]    Patient Name: Clive Mendoza 1945     Medication history has been completed by: Omer Higgins CPhT    Source(s) of information: MAR provided by Tee Sutton     Primary Care Physician: Lucrecia Melissa MD     Pharmacy:     Allergies as of 11/09/2020    (No Known Allergies)        Prior to Admission medications    Medication Sig Start Date End Date Taking? Authorizing Provider   Multiple Vitamins-Minerals (RA CENTRAL-NADEGE WOMENS MATURE PO) Take 1 tablet by mouth daily   Yes Historical Provider, MD   levothyroxine (SYNTHROID) 125 MCG tablet Take 125 mcg by mouth daily    Yes Historical Provider, MD     Medications added or changed (ex. new medication, dosage change, interval change, formulation change):  Central-vit (added)  Levothyroxine dosage change from 75 to 125 mcg daily    Medications removed from list (include reason, ex. noncompliance, medication cost, therapy complete etc.):   Vitamin E patient no longer taking verified with facility  Donepezil patient no longer taking verified with facility  Xarelto patient no longer taking verified with facility    Comments:  MAR provided by Tee Sutton   Last doses marked per information received.     To my knowledge the above medication history is accurate as of 11/9/2020 3:53 PM.   Omer Higgins CPhT   11/9/2020 3:53 PM

## 2020-11-09 NOTE — ED NOTES
Report called to Eddie Vizcarra. at [address] [facility] [address].  Leif, NGUYỄN  11/09/20 2310

## 2020-11-09 NOTE — ED PROVIDER NOTES
eMERGENCY dEPARTMENT eNCOUnter      PCP: Paco Melissa MD    [address] [facility]    Chief Complaint   Patient presents with    Hypotension       HPI    Vianca Maki is a 66 y.o. female with past medical history of Alzheimer's dementia, hypothyroidism who presents from [address] after a blood pressure reading, according to EMS systolic blood pressure was in the 70s. EMS reports on their arrival systolic blood pressure in the 60s, has improved to 678 systolic following 534 cc of fluids in route to the emergency department. No other known history including known falls. No recent fevers, chills, cough. Patient is oriented to person, denying any current symptoms.   Moving all 4 extremities on arrival.        REVIEW OF SYSTEMS  Unable to obtain as patient is poor historian with hx of dementia        All other review of systems are negative  See HPI and nursing notes for additional information     PAST MEDICAL AND SURGICAL HISTORY    Past Medical History:   Diagnosis Date    Alzheimer's dementia ([address]     Dementia ([address]     Thyroid disease     Hypothyroidism    Wears glasses      Past Surgical History:   Procedure Laterality Date    FRACTURE SURGERY Left 2004    Wrist    HIP SURGERY Left 06/29/2016    Left hip hemiarthroplasty    THYROIDECTOMY      WRIST FRACTURE SURGERY Right 8/7/2019    WRIST OPEN REDUCTION INTERNAL FIXATION RIGHT performed by Libby Morgan [facility] at [facility]    Current Outpatient Rx   Medication Sig Dispense Refill    Multiple Vitamins-Minerals (RA CENTRAL-NADEGE WOMENS MATURE PO) Take 1 tablet by mouth daily      levothyroxine (SYNTHROID) 125 MCG tablet Take 125 mcg by mouth daily          ALLERGIES    No Known Allergies    SOCIAL AND FAMILY HISTORY    Social History     Socioeconomic History    Marital status:      Spouse name: None    Number of children: None    Years of education: None    Highest education level: None   Occupational History  None   Social Needs    Financial resource strain: None    Food insecurity     Worry: None     Inability: None    Transportation needs     Medical: None     Non-medical: None   Tobacco Use    Smoking status: Never Smoker    Smokeless tobacco: Never Used   Substance and Sexual Activity    Alcohol use: No    Drug use: No    Sexual activity: None   Lifestyle    Physical activity     Days per week: None     Minutes per session: None    Stress: None   Relationships    Social connections     Talks on phone: None     Gets together: None     Attends Oriental orthodox service: None     Active member of club or organization: None     Attends meetings of clubs or organizations: None     Relationship status: None    Intimate partner violence     Fear of current or ex partner: None     Emotionally abused: None     Physically abused: None     Forced sexual activity: None   Other Topics Concern    None   Social History Narrative    None     History reviewed. No pertinent family history. PHYSICAL EXAM    VITAL SIGNS: /73   Pulse 70   Temp 97.5 °F (36.4 °C) (Oral)   Resp 16   Ht 5' 6\" (1.676 m)   Wt 150 lb (68 kg)   SpO2 96%   BMI 24.21 kg/m²    Constitutional:  Well developed, thin  HENT: Old bruising noted across forehead, green/yellow in color. Normocephalic, Atraumatic, PERRL. EOMI. Sclera clear. Conjunctiva normal, No discharge. Neck/Lymphatics: supple, no JVD, no swollen nodes  Cardiovascular:  Normal heart rate, Normal rhythm, No murmurs  Respiratory:  Nonlabored breathing. Normal breath sounds, No wheezing  Abdomen: Bowel sounds normal, Soft, No tenderness, no masses. Musculoskeletal: No edema, No tenderness, No cyanosis  Integument:  Warm, Dry  Neurologic: Alert to person, No focal deficits noted. Cranial nerves II through XII grossly intact. Normal gross motor coordination & motor strength bilateral upper and lower extremities  Sensation intact.   Psychiatric: Confused, frequently pulling at IV, moving around exam bed    Results for orders placed or performed during the hospital encounter of 11/09/20   CBC auto diff   Result Value Ref Range    WBC 12.9 (H) 4.0 - 10.5 K/CU MM    RBC 4.69 4.2 - 5.4 M/CU MM    Hemoglobin 14.7 12.5 - 16.0 GM/DL    Hematocrit 46.1 37 - 47 %    MCV 98.3 78 - 100 FL    MCH 31.3 (H) 27 - 31 PG    MCHC 31.9 (L) 32.0 - 36.0 %    RDW 13.9 11.7 - 14.9 %    Platelets 288 657 - 315 K/CU MM    MPV 9.6 7.5 - 11.1 FL    Differential Type AUTOMATED DIFFERENTIAL     Segs Relative 90.9 (H) 36 - 66 %    Lymphocytes % 5.1 (L) 24 - 44 %    Monocytes % 3.3 0 - 4 %    Eosinophils % 0.1 0 - 3 %    Basophils % 0.3 0 - 1 %    Segs Absolute 11.7 K/CU MM    Lymphocytes Absolute 0.7 K/CU MM    Monocytes Absolute 0.4 K/CU MM    Eosinophils Absolute 0.0 K/CU MM    Basophils Absolute 0.0 K/CU MM    Nucleated RBC % 0.0 %    Total Nucleated RBC 0.0 K/CU MM    Total Immature Neutrophil 0.04 K/CU MM    Immature Neutrophil % 0.3 0 - 0.43 %   CMP   Result Value Ref Range    Sodium 137 135 - 145 MMOL/L    Potassium 4.6 3.5 - 5.1 MMOL/L    Chloride 102 99 - 110 mMol/L    CO2 23 21 - 32 MMOL/L    BUN 20 6 - 23 MG/DL    CREATININE 0.8 0.6 - 1.1 MG/DL    Glucose 110 (H) 70 - 99 MG/DL    Calcium 9.2 8.3 - 10.6 MG/DL    Alb 3.6 3.4 - 5.0 GM/DL    Total Protein 6.6 6.4 - 8.2 GM/DL    Total Bilirubin 0.4 0.0 - 1.0 MG/DL    ALT 18 10 - 40 U/L    AST 25 15 - 37 IU/L    Alkaline Phosphatase 90 40 - 129 IU/L    GFR Non-African American >60 >60 mL/min/1.73m2    GFR African American >60 >60 mL/min/1.73m2    Anion Gap 12 4 - 16   Troponin   Result Value Ref Range    Troponin T <0.010 <0.01 NG/ML   Lactic Acid, Plasma   Result Value Ref Range    Lactate 1.1 0.4 - 2.0 mMOL/L   EKG 12 Lead   Result Value Ref Range    Ventricular Rate 85 BPM    Atrial Rate 85 BPM    P-R Interval 172 ms    QRS Duration 62 ms    Q-T Interval 376 ms    QTc Calculation (Bazett) 447 ms    P Axis 89 degrees    R Axis 0 degrees    T Axis 61

## 2020-11-09 NOTE — ED NOTES
Unable to obtain urine sample at this time d/t [facility]. Marcos Padron.  NGUYỄN Yadav  11/09/20 7759

## 2020-11-09 NOTE — ED NOTES
[facility] ETA to transport patient back to [facility] @[phone]. Lawrence Yadav RN  11/09/20 1607

## 2020-11-09 NOTE — ED PROVIDER NOTES
EKG is interpreted by me. EKG does show sinus rhythm at a rate of 85 bpm, there is a fair amount of artifact in the EKG. Axis is nondeviated. 2 days of unremarkable, no marked ST segment ovation depressions, [place] interval 172, QRS ration of 62, QTc 4-47. From impression, nonspecific EKG.     Sanjuana Medina  11/9/2020  4:38 PM        Sanjuana Medina MD  11/09/20 0330

## 2020-11-13 LAB
EKG ATRIAL RATE: 85 BPM
EKG DIAGNOSIS: NORMAL
EKG P AXIS: 89 DEGREES
EKG P-R INTERVAL: 172 MS
EKG Q-T INTERVAL: 376 MS
EKG QRS DURATION: 62 MS
EKG QTC CALCULATION (BAZETT): 447 MS
EKG R AXIS: 0 DEGREES
EKG T AXIS: 61 DEGREES
EKG VENTRICULAR RATE: 85 BPM

## 2021-08-29 ENCOUNTER — APPOINTMENT (OUTPATIENT)
Dept: CT IMAGING | Age: 76
End: 2021-08-29
Payer: MEDICARE

## 2021-08-29 ENCOUNTER — HOSPITAL ENCOUNTER (EMERGENCY)
Age: 76
Discharge: HOME | End: 2021-08-29
Attending: EMERGENCY MEDICINE
Payer: MEDICARE

## 2021-08-29 VITALS
HEIGHT: 66 IN | HEART RATE: 58 BPM | BODY MASS INDEX: 19.32 KG/M2 | DIASTOLIC BLOOD PRESSURE: 80 MMHG | SYSTOLIC BLOOD PRESSURE: 121 MMHG | RESPIRATION RATE: 16 BRPM | TEMPERATURE: 97.7 F | WEIGHT: 120.2 LBS | OXYGEN SATURATION: 98 %

## 2021-08-29 DIAGNOSIS — S00.81XA ABRASION OF FACE, INITIAL ENCOUNTER: ICD-10-CM

## 2021-08-29 DIAGNOSIS — S09.90XA CLOSED HEAD INJURY, INITIAL ENCOUNTER: Primary | ICD-10-CM
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
## 2021-08-29 PROCEDURE — 70450 CT HEAD/BRAIN W/O DYE: CPT

## 2021-08-29 PROCEDURE — 99284 EMERGENCY DEPT VISIT MOD MDM: CPT

## 2021-08-29 RX ORDER — ACETAMINOPHEN 325 MG/1
650 TABLET ORAL EVERY 4 HOURS PRN
COMMUNITY

## 2021-08-29 RX ORDER — OYSTER SHELL CALCIUM WITH VITAMIN D 500; 200 MG/1; [IU]/1
2 TABLET, FILM COATED ORAL DAILY
COMMUNITY

## 2021-08-29 ASSESSMENT — ENCOUNTER SYMPTOMS: EYES NEGATIVE: 1

## 2021-08-29 ASSESSMENT — PAIN DESCRIPTION - ORIENTATION: ORIENTATION: LEFT

## 2021-08-29 ASSESSMENT — PAIN DESCRIPTION - LOCATION: LOCATION: FACE

## 2021-08-29 ASSESSMENT — PAIN SCALES - WONG BAKER: WONGBAKER_NUMERICALRESPONSE: 2

## 2021-08-29 NOTE — ED NOTES
Formerly named [facility] [address]. Looking for date of last tetanus vaccine and contact info for next of kin. Nurse states her chart is not complete since she arrived from a facility in [facility] just yesterday and the chart is locked up in an office. No emergency contact numbers are available at this time per nurse at [facility]. Unable to obtain last tetanus vaccine. Registration attempted to call only number we have to reach  but it just rings.       Darío Johnson RN  08/29/21 4797

## 2021-08-29 NOTE — ED PROVIDER NOTES
The history is provided by the nursing home. Fall    Resident of [address] [place] [address],[address] fall this morning.  Found patient with Left sided facial abrasions to forehead, Left, lateral lower portion of eye/cheek and chin.  Patient is acting her normal self per nurse at home. positive Dementia, late stages. [facility]. No other HPI can be obtained due to dementia and nursing home can not give any further information    Review of Systems   Unable to perform ROS: Dementia   Eyes: Negative. All other systems reviewed and are negative. No family history on file. Social History     Socioeconomic History    Marital status:      Spouse name: Not on file    Number of children: Not on file    Years of education: Not on file    Highest education level: Not on file   Occupational History    Not on file   Tobacco Use    Smoking status: Never Smoker    Smokeless tobacco: Never Used   Vaping Use    Vaping Use: Never used   Substance and Sexual Activity    Alcohol use: No    Drug use: No    Sexual activity: Not on file   Other Topics Concern    Not on file   Social History Narrative    Not on file     Social Determinants of Health     Financial Resource Strain:     Difficulty of Paying Living Expenses:    Food Insecurity:     Worried About Running Out of Food in the Last Year:     [address] in the Last Year:    Transportation Needs:     Lack of Transportation (Medical):      Lack of Transportation (Non-Medical):    Physical Activity:     Days of Exercise per Week:     Minutes of Exercise per Session:    Stress:     Feeling of Stress :    Social Connections:     Frequency of Communication with Friends and Family:     Frequency of Social Gatherings with Friends and Family:     Attends Rastafarian Services:     Active Member of Clubs or Organizations:     Attends Club or Organization Meetings:     Marital Status:    Intimate Partner Violence:     Fear of Current or Ex-Partner:     Emotionally Abused:     Physically Abused:     Sexually Abused:      Past Surgical History:   Procedure Laterality Date    FRACTURE SURGERY Left 2004    Wrist    HIP SURGERY Left 06/29/2016    Left hip hemiarthroplasty    THYROIDECTOMY      WRIST FRACTURE SURGERY Right 8/7/2019    WRIST OPEN REDUCTION INTERNAL FIXATION RIGHT performed by Selma Jin MD at [address]     Past Medical History:   Diagnosis Date    [facility]     D[facility]     Muscle weakness (generalized)     Repeated falls     Thyroid disease     Hypothyroidism    Unspecified osteoarthritis, unspecified site     Unsteadiness on feet     Vitamin D deficiency     Wears glasses      No Known Allergies  Prior to Admission medications    Medication Sig Start Date End Date Taking? Authorizing Provider   calcium-vitamin D (OSCAL-500) 500-200 MG-UNIT per tablet Take 1 tablet by mouth daily   Yes Historical Provider, MD   acetaminophen (TYLENOL) 325 MG tablet Take 650 mg by mouth every 6 hours as needed for Pain   Yes Historical Provider, MD   Multiple Vitamins-Minerals (RA CENTRAL-NADEGE WOMENS MATURE PO) Take 1 tablet by mouth daily   Yes Historical Provider, MD   levothyroxine (SYNTHROID) 125 MCG tablet Take 125 mcg by mouth daily    Yes Historical Provider, MD       /80   Pulse 58   Temp 97.7 °F (36.5 °C) (Axillary)   Resp 16   Ht 5' 6\" (1.676 m)   Wt 120 lb 3.2 oz (54.5 kg)   SpO2 98%   BMI 19.40 kg/m²     Physical Exam  Vitals and nursing note reviewed. HENT:      Head: Normocephalic. Abrasion present. No Garcia's sign or laceration. Jaw: There is normal jaw occlusion. Comments: Abrasions  No midface instability no palpable deformity     Mouth/Throat:      Pharynx: Oropharynx is clear. Eyes:      Extraocular Movements: Extraocular movements intact. Cardiovascular:      Rate and Rhythm: Normal rate. Pulses: Normal pulses.    Pulmonary:      Effort: Pulmonary effort is normal. Abdominal:      Tenderness: There is no abdominal tenderness. Musculoskeletal:         General: No tenderness, deformity or signs of injury. Neurological:      General: No focal deficit present. Mental Status: Mental status is at baseline. MDM:    Labs Reviewed - No data to display    CT HEAD WO CONTRAST   Final Result   No acute intracranial abnormality. Minimal right mastoid effusion. Supportive care. My typical dicussion, presentation, and considerations for this patients' chief complaint, diagnosis, differential diagnosis, medications, medication use,  medication safety and medication interactions have been explained and outlined to this patient for this patient encounter. I have stressed need for follow up and reexamination for this encounter and I have contacted hospitalist for admission      Final Impression    1. Closed head injury, initial encounter    2. Fall, initial encounter    3.  Abrasion of face, initial encounter              Chencho Barahona DO  08/29/21 6340

## 2021-08-29 NOTE — ED NOTES
[facility] ETA is [phone] to transport back to [facility].        Landon Howe, NGUYỄN  08/29/21 1877

## 2021-08-29 NOTE — ED NOTES
Pt out of bed walking in other patient's rooms. Pt smiles but does not answer questions appropriately. Pt placed in bed and out in hallway, pt continues to attempt to get out of bed, staff at side.      Fercho Landin RN  08/29/21 3850

## 2022-01-31 ENCOUNTER — APPOINTMENT (OUTPATIENT)
Dept: GENERAL RADIOLOGY | Age: 77
End: 2022-01-31
Payer: MEDICARE

## 2022-01-31 ENCOUNTER — HOSPITAL ENCOUNTER (EMERGENCY)
Age: 77
Discharge: HOME | End: 2022-01-31
Attending: EMERGENCY MEDICINE
Payer: MEDICARE

## 2022-01-31 VITALS
HEART RATE: 80 BPM | SYSTOLIC BLOOD PRESSURE: 103 MMHG | RESPIRATION RATE: 16 BRPM | DIASTOLIC BLOOD PRESSURE: 58 MMHG | TEMPERATURE: 98.4 F | OXYGEN SATURATION: 95 %
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
## 2022-01-31 PROCEDURE — 99283 EMERGENCY DEPT VISIT LOW MDM: CPT

## 2022-01-31 PROCEDURE — 73502 X-RAY EXAM HIP UNI 2-3 VIEWS: CPT

## 2022-01-31 PROCEDURE — 99282 EMERGENCY DEPT VISIT SF MDM: CPT

## 2022-01-31 NOTE — ED NOTES
Spouse is in vehicle waiting, he can be reached at [phone]. Rometta Favre Simon Singleton    Electronically signed by Anjelica He on 1/31/2022 at 1:27 PM

## 2022-01-31 NOTE — ED NOTES
Attempted to call report to [facility] without success, no answer     Robin Mas, RN  01/31/22 8692

## 2022-01-31 NOTE — ED NOTES
Called [facility] to take patient back home, will be here in 90 minutes     Tomy Deleon  01/31/22 8653

## 2022-01-31 NOTE — ED PROVIDER NOTES
EMERGENCY DEPARTMENT ENCOUNTER      CHIEF COMPLAINT:   Fall    HPI: Adriana Polo is a 64 y.o. female who presents to the emergency department, via EMS, from assisted living after she had a fall. The patient has a history of dementia and is minimally verbal and unable to provide any history. I spoke with her  by telephone who states that he did not witness the fall but was told that she fell onto her buttocks. There was no reported head injury. He states the patient was able to get up and walk to the couch. She seemed to wince with palpation of her right hip and so she was sent here for further evaluation. He states he thinks that she is \"just fine\" since she was able to walk. No further information is able to be obtained. REVIEW OF SYSTEMS:   \"Remaining review of systems unable to obtain as the patient is minimally verbal with a history of dementia. I have reviewed the nursing triage documentation and agree unless otherwise noted below. \"      PAST MEDICAL HISTORY:   Past Medical History:   Diagnosis Date    [facility]     Dementia ([facility]     Muscle weakness (generalized)     Repeated falls     Thyroid disease     Hypothyroidism    Unspecified osteoarthritis, unspecified site     Unsteadiness on feet     Vitamin D deficiency     Wears glasses        CURRENT MEDICATIONS:   Home medications reviewed. SURGICAL HISTORY:   Past Surgical History:   Procedure Laterality Date    FRACTURE SURGERY Left 2004    Wrist    HIP SURGERY Left 06/29/2016    Left hip hemiarthroplasty    THYROIDECTOMY      WRIST FRACTURE SURGERY Right 8/7/2019    WRIST OPEN REDUCTION INTERNAL FIXATION RIGHT performed by Janna Brambila MD at [address] [place] [address]:   No family history on file.     SOCIAL HISTORY:   Social History     Socioeconomic History    Marital status:      Spouse name: Not on file    Number of children: Not on file    Years of education: Not on file    Highest education level: Not on file   Occupational History    Not on file   Tobacco Use    Smoking status: Never Smoker    Smokeless tobacco: Never Used   Vaping Use    Vaping Use: Never used   Substance and Sexual Activity    Alcohol use: No    Drug use: No    Sexual activity: Not on file   Other Topics Concern    Not on file   Social History Narrative    Not on file     Social Determinants of Health     Financial Resource Strain:     Difficulty of Paying Living Expenses: Not on file   Food Insecurity:     Worried About Running Out of Food in the Last Year: Not on file    Dell of Food in the Last Year: Not on file   Transportation Needs:     Lack of Transportation (Medical): Not on file    Lack of Transportation (Non-Medical): Not on file   Physical Activity:     Days of Exercise per Week: Not on file    Minutes of Exercise per Session: Not on file   Stress:     Feeling of Stress : Not on file   Social Connections:     Frequency of Communication with Friends and Family: Not on file    Frequency of Social Gatherings with Friends and Family: Not on file    Attends Yazdanism Services: Not on file    Active Member of [address] [place] [address] or Organizations: Not on file    Attends Club or Organization Meetings: Not on file    Marital Status: Not on file   Intimate Partner Violence:     Fear of Current or Ex-Partner: Not on file    Emotionally Abused: Not on file    Physically Abused: Not on file    Sexually Abused: Not on file   Housing Stability:     Unable to Pay for Housing in the Last Year: Not on file    Number of [place] in the Last Year: Not on file    Unstable Housing in the Last Year: Not on file       ALLERGIES: Patient has no known allergies.     PHYSICAL EXAM:  VITAL SIGNS:   ED Triage Vitals [01/31/22 1300]   Enc Vitals Group      BP (!) 103/58      Pulse 80      Resp 16      Temp 98.4 °F (36.9 °C)      Temp Source Oral      SpO2 95 %      Weight       Height       Head Circumference       Peak Flow       Pain Score       Pain Loc       Pain Edu? Excl. in [address]? Constitutional:  Non-toxic appearance  HENT: Normocephalic, Atraumatic, Bilateral external ears normal, Oropharynx moist, No oral exudates, Nose normal.  Eyes:  PERRL, Conjunctiva normal, No discharge. Neck: Normal range of motion, No tenderness, Supple, No stridor, No lymphadenopathy. Cardiovascular:  Normal heart rate, Normal rhythm  Pulmonary/Chest:  Normal breath sounds, No respiratory distress, No wheezing  Abdomen: Bowel sounds normal, Soft, No tenderness, No masses, No pulsatile masses  Extremities:  Normal range of motion, Intact distal pulses, No edema, No tenderness  Neurologic: Awake and alert, confused, follows simple commands, moves all extremities spontaneously  Skin:  Warm, Dry, No erythema, No rash      EKG Interpretation  None    Radiology / Procedures:  XR HIP 2-3 VW W PELVIS RIGHT (Final result)  Result time 01/31/22 15:36:38  Final result by Salvador Knight (01/31/22 15:36:38)                Impression:    1.  Moderate osteoarthritis of the right hip joint. 2.  Postsurgical changes of left total hip arthroplasty without evidence of   hardware complication. Narrative:    EXAMINATION:   ONE XRAY VIEW OF THE PELVIS AND TWO XRAY VIEWS RIGHT HIP     1/31/2022 3:00 pm     COMPARISON:   06/29/2016 left hip and pelvis radiographs     HISTORY:   ORDERING SYSTEM PROVIDED HISTORY: fall   TECHNOLOGIST PROVIDED HISTORY:   Reason for exam:->fall     FINDINGS:   Postsurgical changes of left total hip arthroplasty without evidence of   hardware complication.  A small amount of heterotopic ossification is noted   along the proximal femoral component.  No acute fractures or dislocations in   the pelvis or right hip.  Bilateral femoral head contours are intact. Moderate degenerative changes of the right hip joint. .  Symphysis pubis is   not widened.  Bilateral sacroiliac joints are intact.  Degenerative changes of   the visualized lower lumbar spine. ED COURSE & MEDICAL DECISION MAKING:  Pertinent Labs & Imaging studies reviewed. (See chart for details)  On exam, the patient is afebrile and nontoxic appearing. She is hemodynamically stable and neurologically intact. Right hand x-rays are negative. I suspect that the patient had a mechanical fall. I have a low suspicion for closed head injury, concussion, cervical spine injury, acute fracture or dislocation of the hip or any other acute bony injury or abnormality. I feel that the patient is stable for outpatient management follow up in 2-3 days. The patient is given return precautions. The patient was in stable condition at the time of transport arrival and departure to take her back to assisted living. Clinical Impression:  1. Fall, initial encounter        Disposition referral (if applicable):  Inge Melissa MD  [address] [address]  [phone]    Schedule an appointment as soon as possible for a visit       [facility] Emergency Department  [address] [facility]  [phone]  Go to   If symptoms worsen      Disposition medications (if applicable):  Discharge Medication List as of 1/31/2022  5:22 PM            Comment: Please note this report has been produced using speech recognition software and may contain errors related to that system including errors in grammar, punctuation, and spelling, as well as words and phrases that may be inappropriate. If there are any questions or concerns please feel free to contact the dictating provider for clarification.        Kelsy Alfonso MD  01/31/22 7318

## 2022-08-12 ENCOUNTER — APPOINTMENT (OUTPATIENT)
Dept: CT IMAGING | Age: 77
End: 2022-08-12
Payer: MEDICARE

## 2022-08-12 ENCOUNTER — HOSPITAL ENCOUNTER (EMERGENCY)
Age: 77
Discharge: HOME | End: 2022-08-12
Attending: EMERGENCY MEDICINE
Payer: MEDICARE

## 2022-08-12 VITALS
HEIGHT: 66 IN | TEMPERATURE: 97 F | SYSTOLIC BLOOD PRESSURE: 127 MMHG | OXYGEN SATURATION: 97 % | HEART RATE: 97 BPM | RESPIRATION RATE: 18 BRPM | DIASTOLIC BLOOD PRESSURE: 71 MMHG | BODY MASS INDEX: 19.29 KG/M2 | WEIGHT: 120 LBS

## 2022-08-12 DIAGNOSIS — S01.81XA FACIAL LACERATION, INITIAL ENCOUNTER: ICD-10-CM
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
## 2022-08-12 PROCEDURE — 70450 CT HEAD/BRAIN W/O DYE: CPT

## 2022-08-12 PROCEDURE — 12011 RPR F/E/E/N/L/M 2.5 CM/<: CPT

## 2022-08-12 PROCEDURE — 72125 CT NECK SPINE W/O DYE: CPT

## 2022-08-12 PROCEDURE — 6370000000 HC RX 637 (ALT 250 FOR IP): Performed by: EMERGENCY MEDICINE

## 2022-08-12 PROCEDURE — 2500000003 HC RX 250 WO HCPCS: Performed by: EMERGENCY MEDICINE

## 2022-08-12 PROCEDURE — 99284 EMERGENCY DEPT VISIT MOD MDM: CPT

## 2022-08-12 RX ORDER — MELATONIN 5 MG
5 TABLET,CHEWABLE ORAL NIGHTLY
COMMUNITY

## 2022-08-12 RX ORDER — GINSENG 100 MG
CAPSULE ORAL ONCE
Status: COMPLETED | OUTPATIENT
Start: 2022-08-12 | End: 2022-08-12

## 2022-08-12 RX ORDER — LIDOCAINE HYDROCHLORIDE AND EPINEPHRINE 10; 10 MG/ML; UG/ML
10 INJECTION, SOLUTION INFILTRATION; PERINEURAL ONCE
Status: COMPLETED | OUTPATIENT
Start: 2022-08-12 | End: 2022-08-12

## 2022-08-12 RX ORDER — LORAZEPAM 1 MG/1
1 TABLET ORAL ONCE
Status: COMPLETED | OUTPATIENT
Start: 2022-08-12 | End: 2022-08-12

## 2022-08-12 RX ADMIN — LORAZEPAM 1 MG: 1 TABLET ORAL at 15:07

## 2022-08-12 RX ADMIN — Medication 14 G: at 16:51

## 2022-08-12 RX ADMIN — LIDOCAINE HYDROCHLORIDE,EPINEPHRINE BITARTRATE 10 ML: 10; .01 INJECTION, SOLUTION INFILTRATION; PERINEURAL at 16:21

## 2022-08-12 RX ADMIN — Medication 3 ML: at 16:21

## 2022-08-12 ASSESSMENT — PAIN SCALES - PAIN ASSESSMENT IN ADVANCED DEMENTIA (PAINAD)
CONSOLABILITY: 1
BODYLANGUAGE: 1
NEGVOCALIZATION: 1
BREATHING: 0
FACIALEXPRESSION: 1
TOTALSCORE: 4

## 2022-08-12 ASSESSMENT — PAIN - FUNCTIONAL ASSESSMENT: PAIN_FUNCTIONAL_ASSESSMENT: PAIN ASSESSMENT IN ADVANCED DEMENTIA (PAINAD)

## 2022-08-12 NOTE — ED PROVIDER NOTES
Emergency Department Encounter  Location: [place] At [address] [place] [address]    Patient: Sunitha Solorzano  MRN: [id number]  : 1945  Date of evaluation: 2022  ED Provider: Rustam Chin DO, FACEP    Chief Complaint:    Fall (Unwitnessed fall in the hallway at [facility]. Patient has a laceration to the bottom of the chin. EMS states patients behavior is at baseline. )    [place]:  Sunitha Solorzano is a 76 y.o. female that presents to the emergency department from local nursing home after falling and striking her chin against the floor. The patient has a 1 cm laceration on her chin. There was no loss of consciousness as relayed by the nursing home. Patient is somewhat demented and nonverbal.  She keeps trying to climb out of the bed upon evaluation. Her neck seems nontender and the patient is moving all extremities. She does have a laceration that is not actively bleeding at this time. This is present on her chin. She has no tenderness to palpation of her abdomen chest upper or lower extremities. She seems to have no other injuries. ROS:  Unable to be obtained as the patient is unable to speak in meaningful verbiage. Past Medical History:   Diagnosis Date    Alzheimer's dementia ([address]     Dementia ([address]     Muscle weakness (generalized)     Repeated falls     Thyroid disease     Hypothyroidism    Unspecified osteoarthritis, unspecified site     Unsteadiness on feet     Vitamin D deficiency     Wears glasses      Past Surgical History:   Procedure Laterality Date    FRACTURE SURGERY Left     Wrist    HIP SURGERY Left 2016    Left hip hemiarthroplasty    THYROIDECTOMY      WRIST FRACTURE SURGERY Right 2019    WRIST OPEN REDUCTION INTERNAL FIXATION RIGHT performed by Ofelia Lovett MD at [place] [address] reviewed. No pertinent family history.   Social History     Socioeconomic History    Marital status:      Spouse name: Not on file    Number of children: Not on file Years of education: Not on file    Highest education level: Not on file   Occupational History    Not on file   Tobacco Use    Smoking status: Never    Smokeless tobacco: Never   Vaping Use    Vaping Use: Never used   Substance and Sexual Activity    Alcohol use: No    Drug use: No    Sexual activity: Not on file   Other Topics Concern    Not on file   Social History Narrative    Not on file     Social Determinants of Health     Financial Resource Strain: Not on file   Food Insecurity: Not on file   Transportation Needs: Not on file   Physical Activity: Not on file   Stress: Not on file   Social Connections: Not on file   Intimate Partner Violence: Not on file   Housing Stability: Not on file     Current Facility-Administered Medications   Medication Dose Route Frequency Provider Last Rate Last Admin    bacitracin zinc ointment   Topical Once Iraidasanjuana Singer, DO         Current Outpatient Medications   Medication Sig Dispense Refill    Melatonin 5 MG CHEW Take 5 mg by mouth at bedtime      calcium-vitamin D (OSCAL-500) 500-200 MG-UNIT per tablet Take 2 tablets by mouth in the morning. acetaminophen (TYLENOL) 325 MG tablet Take 650 mg by mouth every 4 hours as needed for Pain      Multiple Vitamins-Minerals (RA CENTRAL-NADEGE WOMENS MATURE PO) Take 1 tablet by mouth daily      levothyroxine (SYNTHROID) 125 MCG tablet Take 125 mcg by mouth daily        No Known Allergies  Nursing Notes Reviewed    Physical Exam:  ED Triage Vitals [08/12/22 1434]   Enc Vitals Group      /71      Heart Rate 97      Resp 18      Temp       Temp src       SpO2 97 %      Weight 120 lb (54.4 kg)      Height 5' 6\" (1.676 m)      Head Circumference       Peak Flow       Pain Score       Pain Loc       Pain Edu? Excl. in [address]? GENERAL APPEARANCE: Awake and alert. Cooperative. No acute distress. She is trying to crawl out of the bed by putting her legs over the right railing. HEAD: Normocephalic.   There is a 1 cm laceration to her chin. It is not actively bleeding. EYES: Sclera anicteric. Pupils are equally reactive to light extraocular motions are intact  ENT: Tolerates saliva. NECK: Supple. Trachea midline. Nontender to palpation with full range of motion present. LUNGS: Respirations unlabored. Chest wall nontender to palpation  EXTREMITIES: No acute deformities. Extremities nontender to palpation. Pelvis is nontender to compression  SKIN: Warm and dry. NEUROLOGICAL: No gross facial drooping. PSYCHIATRIC: Patient is demented and unable to express mood at this time    Labs:  No results found for this visit on 08/12/22. Radiographs (if obtained):  [] The following radiograph was interpreted by myself in the absence of a radiologist:  [x] Radiologist's Report reviewed at time of ED visit:  CT Cervical Spine WO Contrast   Final Result   No acute osseous abnormality of the cervical spine. CT Head WO Contrast   Final Result   No acute intracranial abnormality. Procedure Note - Laceration repair:  Questions were sought and answered and verbal consent was given by patient and spouse for the procedure. The area was prepped and draped in standard bedside fashion. The wound area was anesthetized with 3ml of Lidocaine 1% with epinephrine with added sodium bicarbonate. The wound was explored with No foreign bodies found. The wound was repaired with 4-0 Prolene; 4 continuous running sutures were used. The patient tolerated the procedure well without complications and my repeat neurovascular exam post-procedure is unchanged. Wound care and scar minimization education was provided. Instructions were given to return for increasing pain, redness, streaking, discharge, or any other worsening or worrisome concerns. ED Course and MDM:  Here in the emergency department patient had let placed on her wound and then was anesthetized using 1% lidocaine with epinephrine and bicarb.   She also received a milligram Ativan because she was trying to crawl out of the bed. This did sedate her enough that we were able to perform the procedure without endangering her or the staff. She was cooperative during the procedure. She was given a head CT and a neck CT which were both read as negative by the radiologist.  Her  is at her bedside. She will be returned to her assisted living apartment. She is to have her sutures removed in 7 to 10 days at her primary care doctor's office or to return to the emergency department. She is to return for any problems or concerns. I recommend using triple antibiotic ointment to the area twice daily. She is discharged stable condition at this time. Final Impression:  1. Fall, initial encounter    2.  Facial laceration, initial encounter      DISPOSITION Discharge - Pending Orders Complete    Patient referred to:  Jessy Melissa MD  [address] [place] [address]  [phone]    Schedule an appointment as soon as possible for a visit in 7 days  For wound re-check, For suture removal  Discharge medications:  Current Discharge Medication List        (Please note that portions of this note may have been completed with a voice recognition program. Efforts were made to edit the dictations but occasionally words are mis-transcribed.)    Carol Browne DO, [facility]  Board certified in [address] [place] [address],[address] [place], [place]  08/12/22 1099

## 2022-08-12 NOTE — DISCHARGE INSTRUCTIONS
Use triple antibiotic ointment to the area twice daily. Stitches need to come out in 7 to 10 days. Return for any problems or concerns.

## 2022-08-12 NOTE — ED TRIAGE NOTES
Patient arrived to room 4 by [facility] from [facility] with complaints of fall x2, laceration to the bottom of the chin. Patient is alert but not orientated. Gauze applied with tape to bottom of the chin to control bleeding.

## 2022-08-19 ENCOUNTER — HOSPITAL ENCOUNTER (EMERGENCY)
Age: 77
Discharge: HOME | End: 2022-08-19
Attending: EMERGENCY MEDICINE
Payer: MEDICARE

## 2022-08-19 VITALS
RESPIRATION RATE: 16 BRPM | SYSTOLIC BLOOD PRESSURE: 129 MMHG | OXYGEN SATURATION: 97 % | DIASTOLIC BLOOD PRESSURE: 86 MMHG | HEART RATE: 72 BPM

## 2022-08-19 DIAGNOSIS — Z48.02 ENCOUNTER FOR REMOVAL OF SUTURES: Primary | ICD-10-CM

## 2022-08-19 PROCEDURE — 99282 EMERGENCY DEPT VISIT SF MDM: CPT

## 2022-08-19 NOTE — DISCHARGE INSTRUCTIONS
Continue to use triple antibiotic ointment to the chin wound twice daily for the next 10 days. Return for any problems or concerns.

## 2022-08-19 NOTE — ED TRIAGE NOTES
Arrived to room 6 for triage ambulatory assisted by her . Tolerated without difficulty. Bed in lowest position. Call light given.

## 2022-08-19 NOTE — ED PROVIDER NOTES
Emergency Department Encounter  Location: [place] At [address] [place] [address]    Patient: Iraida Telles  MRN: [id number]  : 1945  Date of evaluation: 2022  ED Provider: Iraida Singer DO, FACEP    Chief Complaint:    Suture / Staple Removal (Sutures placed in the chin 2022)    [place]:  Iraida Telles is a 76 y.o. female that presents to the emergency department to have her sutures removed. The patient had sutures placed by me about a week ago after she fell and lacerated her chin. Patient lives in assisted living and she is here with her . ROS:  At least 4 systems reviewed and otherwise acutely negative except as in the [address] Negative for fever or chills  Negative for chest pain  Negative for shortness of breath  Negative for nausea vomiting diarrhea or constipation    Past Medical History:   Diagnosis Date    Alzheimer's dementia ([address]     Dementia ([address]     Muscle weakness (generalized)     Repeated falls     Thyroid disease     Hypothyroidism    Unspecified osteoarthritis, unspecified site     Unsteadiness on feet     Vitamin D deficiency     Wears glasses      Past Surgical History:   Procedure Laterality Date    FRACTURE SURGERY Left     Wrist    HIP SURGERY Left 2016    Left hip hemiarthroplasty    THYROIDECTOMY      WRIST FRACTURE SURGERY Right 2019    WRIST OPEN REDUCTION INTERNAL FIXATION RIGHT performed by Joey Mcgrath MD at [place] [address] reviewed. No pertinent family history.   Social History     Socioeconomic History    Marital status:      Spouse name: Not on file    Number of children: Not on file    Years of education: Not on file    Highest education level: Not on file   Occupational History    Not on file   Tobacco Use    Smoking status: Never    Smokeless tobacco: Never   Vaping Use    Vaping Use: Never used   Substance and Sexual Activity    Alcohol use: No    Drug use: No    Sexual activity: Not on file   Other Topics Concern Not on file   Social History Narrative    Not on file     Social Determinants of Health     Financial Resource Strain: Not on file   Food Insecurity: Not on file   Transportation Needs: Not on file   Physical Activity: Not on file   Stress: Not on file   Social Connections: Not on file   Intimate Partner Violence: Not on file   Housing Stability: Not on file     No current facility-administered medications for this encounter. Current Outpatient Medications   Medication Sig Dispense Refill    Melatonin 5 MG CHEW Take 5 mg by mouth at bedtime      calcium-vitamin D (OSCAL-500) 500-200 MG-UNIT per tablet Take 2 tablets by mouth in the morning. acetaminophen (TYLENOL) 325 MG tablet Take 650 mg by mouth every 4 hours as needed for Pain      Multiple Vitamins-Minerals (RA CENTRAL-NADEGE WOMENS MATURE PO) Take 1 tablet by mouth daily      levothyroxine (SYNTHROID) 125 MCG tablet Take 125 mcg by mouth daily        No Known Allergies  Nursing Notes Reviewed    Physical Exam:  ED Triage Vitals [08/19/22 1444]   Enc Vitals Group      /86      Heart Rate 72      Resp 16      Temp       Temp src       SpO2 97 %      Weight       Height       Head Circumference       Peak Flow       Pain Score       Pain Loc       Pain Edu? Excl. in [address]? GENERAL APPEARANCE: Awake and alert. Cooperative. No acute distress. Nontoxic in appearance  HEAD: Normocephalic. Atraumatic. There is a healing laceration with a scab present on her chin. The continuous running suture has been trimmed on the left side and there is suture extending from the wound it is tied on the right side. The wound is closed and healing normally. There is no evidence of infection  EYES: Sclera anicteric. ENT: Tolerates saliva. NECK: Supple. Trachea midline. LUNGS: Respirations unlabored. EXTREMITIES: No acute deformities. SKIN: Warm and dry. NEUROLOGICAL: No gross facial drooping. PSYCHIATRIC: Normal mood.     Labs:  No results found for this visit on 08/19/22. Radiographs (if obtained):  [] The following radiograph was interpreted by myself in the absence of a radiologist:  [x] Radiologist's Report reviewed at time of ED visit:  No orders to display     Procedure: The remainder of the sutures were removed without difficulty. They were removed in toto. She tolerated the procedure well. ED Course and MDM:  Patient presents to the emergency department to have her sutures removal.  They have been removed and she will be discharged stable condition on recommending using triple antibiotic ointment to the area twice daily and to return for any problems or concerns. She is discharged stable condition at this time. Final Impression:  1.  Encounter for removal of sutures      DISPOSITION Decision To Discharge    Patient referred to:  Jenni Melissa MD  [address] [place] [address]  [phone]    Go to   As needed    Discharge medications:  Current Discharge Medication List        (Please note that portions of this note may have been completed with a voice recognition program. Efforts were made to edit the dictations but occasionally words are mis-transcribed.)    Carlos Mitchell DO, [facility]  Board certified in [address] [place] [address] AT [place], [place]  08/19/22 8366

## 2022-11-20 ENCOUNTER — HOSPITAL ENCOUNTER (EMERGENCY)
Age: 77
Discharge: HOME | End: 2022-11-20
Attending: EMERGENCY MEDICINE
Payer: MEDICARE

## 2022-11-20 ENCOUNTER — APPOINTMENT (OUTPATIENT)
Dept: CT IMAGING | Age: 77
End: 2022-11-20
Payer: MEDICARE

## 2022-11-20 VITALS
OXYGEN SATURATION: 100 % | RESPIRATION RATE: 18 BRPM | TEMPERATURE: 97.2 F | HEART RATE: 91 BPM | SYSTOLIC BLOOD PRESSURE: 142 MMHG | DIASTOLIC BLOOD PRESSURE: 78 MMHG

## 2022-11-20 DIAGNOSIS — S01.01XA LACERATION OF SCALP, INITIAL ENCOUNTER: Primary | ICD-10-CM
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
## 2022-11-20 PROCEDURE — 12002 RPR S/N/AX/GEN/TRNK2.6-7.5CM: CPT

## 2022-11-20 PROCEDURE — 70450 CT HEAD/BRAIN W/O DYE: CPT

## 2022-11-20 PROCEDURE — 99284 EMERGENCY DEPT VISIT MOD MDM: CPT

## 2022-11-20 NOTE — ED PROVIDER NOTES
Triage Chief Complaint:   No chief complaint on file. [place]:  Edelmira Chavez is a 76 y.o. female that presents to the ED status post fall. Patient is a very pleasant 78-year-old female not on any antiplatelet Antithrombin is centimeters fell she stated laceration of the right side of her scalp. She has underlying dementia Alzheimer's disease is a DNR comfort care. No injuries to her head and neck or extremities. Past Medical History:   Diagnosis Date    Alzheimer's dementia ([address]     Dementia ([facility]     Muscle weakness (generalized)     Repeated falls     Thyroid disease     Hypothyroidism    Unspecified osteoarthritis, unspecified site     Unsteadiness on feet     Vitamin D deficiency     Wears glasses      Past Surgical History:   Procedure Laterality Date    FRACTURE SURGERY Left 2004    Wrist    HIP SURGERY Left 06/29/2016    Left hip hemiarthroplasty    THYROIDECTOMY      WRIST FRACTURE SURGERY Right 8/7/2019    WRIST OPEN REDUCTION INTERNAL FIXATION RIGHT performed by Allen Sorensen MD at [place] [address]     No family history on file.   Social History     Socioeconomic History    Marital status:      Spouse name: Not on file    Number of children: Not on file    Years of education: Not on file    Highest education level: Not on file   Occupational History    Not on file   Tobacco Use    Smoking status: Never    Smokeless tobacco: Never   Vaping Use    Vaping Use: Never used   Substance and Sexual Activity    Alcohol use: No    Drug use: No    Sexual activity: Not on file   Other Topics Concern    Not on file   Social History Narrative    Not on file     Social Determinants of Health     Financial Resource Strain: Not on file   Food Insecurity: Not on file   Transportation Needs: Not on file   Physical Activity: Not on file   Stress: Not on file   Social Connections: Not on file   Intimate Partner Violence: Not on file   Housing Stability: Not on file     No current facility-administered medications for this encounter. Current Outpatient Medications   Medication Sig Dispense Refill    Melatonin 5 MG CHEW Take 5 mg by mouth at bedtime      calcium-vitamin D (OSCAL-500) 500-200 MG-UNIT per tablet Take 2 tablets by mouth in the morning. acetaminophen (TYLENOL) 325 MG tablet Take 650 mg by mouth every 4 hours as needed for Pain      Multiple Vitamins-Minerals (RA CENTRAL-NADEGE WOMENS MATURE PO) Take 1 tablet by mouth daily      levothyroxine (SYNTHROID) 125 MCG tablet Take 125 mcg by mouth daily        No Known Allergies      ROS:    Review of Systems   Unable to perform ROS: Dementia     Nursing Notes Reviewed    Physical Exam:      ED Triage Vitals [11/20/22 1722]   Enc Vitals Group      BP       Heart Rate 91      Resp 18      Temp 97.2 °F (36.2 °C)      Temp Source Oral      SpO2 100 %      Weight       Height       Head Circumference       Peak Flow       Pain Score       Pain Loc       Pain Edu? Excl. in [address]? Physical Exam  Vitals and nursing note reviewed. Constitutional:       Appearance: She is normal weight. She is ill-appearing. HENT:      Head: Laceration present. Comments: Laceration 3 cm light of temporoparietal region     Right Ear: Tympanic membrane, ear canal and external ear normal.      Left Ear: Tympanic membrane and ear canal normal.      Nose: Nose normal.      Mouth/Throat:      Mouth: Mucous membranes are moist.   Eyes:      General:         Right eye: No discharge. Extraocular Movements: Extraocular movements intact. Conjunctiva/sclera: Conjunctivae normal.      Pupils: Pupils are equal, round, and reactive to light. Cardiovascular:      Rate and Rhythm: Normal rate and regular rhythm. Pulmonary:      Effort: Pulmonary effort is normal.   Abdominal:      General: Abdomen is flat. Palpations: Abdomen is soft. Musculoskeletal:         General: Normal range of motion. Cervical back: Normal range of motion and neck supple. No tenderness. Comments: Vera's are thin muscle wasting slightly contracted no bony deformity throughout the pelvis upper lower extremity with palpation   Neurological:      Mental Status: She is disoriented. Psychiatric:         Attention and Perception: She is inattentive. Speech: Speech is delayed. Behavior: Behavior is agitated. Judgment: Judgment is impulsive. I have reviewed and interpreted all of the currently available lab results from this visit (ifapplicable):  No results found for this visit on 11/20/22. Radiographs (if obtained):  [] The following radiograph wasinterpreted by myself in the absence of a radiologist:   [] Radiologist's Report Reviewed:  CT HEAD [address]    (Results Pending)         EKG (if obtained): (All EKG's are interpreted by myself in the absence of a cardiologist)    Chart review shows recent radiographs:  No results found. MDM:            Procedure Note - Laceration repair: SCALP:  3 cm  Questions were sought and answered and verbal consent was given by patient for the procedure. The area was prepped and draped in standard bedside fashion. The wound was explored with [place] ; several . The wound was repaired The patient tolerated the procedure well without complications and my repeat neurovascular exam post-procedure is unchanged. Wound care and scar minimization education was provided. Instructions were given to return for increasing pain, redness, streaking, discharge, or any other worsening or worrisome concerns. 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 Patient is stable  does not want any further intervention. Clinical Impression:  1. Laceration of scalp, initial encounter    2.  Fall from bed, initial encounter      Disposition referral (if applicable):  Alvin Melissa MD  [address] [facility]  [phone]    Go in 1 week  For suture removal  Disposition medications (if applicable):  New Prescriptions    No medications on file           Fred Mcknight DO, FACEP      Comment: Please note this report has been produced using speech recognition software and maycontain errors related to that system including errors in grammar, punctuation, and spelling, as well as words and phrases that may be inappropriate. If there are any questions or concerns please feel free to contact thedictating provider for clarification.         Aleksandr Vega DO  11/20/22 9394

## 2024-10-04 ENCOUNTER — APPOINTMENT (OUTPATIENT)
Dept: GENERAL RADIOLOGY | Age: 79
End: 2024-10-04

## 2024-10-04 ENCOUNTER — HOSPITAL ENCOUNTER (EMERGENCY)
Age: 79
Discharge: NURSING, REHAB OR LONG-TERM CARE | End: 2024-10-04
Attending: STUDENT IN AN ORGANIZED HEALTH CARE EDUCATION/TRAINING PROGRAM

## 2024-10-04 ENCOUNTER — APPOINTMENT (OUTPATIENT)
Dept: CT IMAGING | Age: 79
End: 2024-10-04

## 2024-10-04 VITALS
BODY MASS INDEX: 18.16 KG/M2 | HEIGHT: 66 IN | DIASTOLIC BLOOD PRESSURE: 77 MMHG | HEART RATE: 70 BPM | SYSTOLIC BLOOD PRESSURE: 148 MMHG | RESPIRATION RATE: 18 BRPM | TEMPERATURE: 98.1 F | OXYGEN SATURATION: 95 % | WEIGHT: 113 LBS

## 2024-10-04 DIAGNOSIS — F03.90 DEMENTIA, UNSPECIFIED DEMENTIA SEVERITY, UNSPECIFIED DEMENTIA TYPE, UNSPECIFIED WHETHER BEHAVIORAL, PSYCHOTIC, OR MOOD DISTURBANCE OR ANXIETY (HCC): ICD-10-CM
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
## 2024-10-04 DIAGNOSIS — R03.0 ELEVATED BLOOD PRESSURE READING: ICD-10-CM

## 2024-10-04 LAB — GLUCOSE BLD-MCNC: 107 MG/DL (ref 74–99)

## 2024-10-04 PROCEDURE — 82962 GLUCOSE BLOOD TEST: CPT

## 2024-10-04 PROCEDURE — 99284 EMERGENCY DEPT VISIT MOD MDM: CPT

## 2024-10-04 PROCEDURE — 70450 CT HEAD/BRAIN W/O DYE: CPT

## 2024-10-04 ASSESSMENT — LIFESTYLE VARIABLES
HOW MANY STANDARD DRINKS CONTAINING ALCOHOL DO YOU HAVE ON A TYPICAL DAY: PATIENT DOES NOT DRINK
HOW OFTEN DO YOU HAVE A DRINK CONTAINING ALCOHOL: NEVER

## 2024-10-04 ASSESSMENT — PAIN - FUNCTIONAL ASSESSMENT: PAIN_FUNCTIONAL_ASSESSMENT: ADULT NONVERBAL PAIN SCALE (NPVS)

## 2024-10-05 NOTE — ED NOTES
[facility] here to transport back to [facility].   Report called to [facility].

## 2024-10-05 NOTE — ED PROVIDER NOTES
Impression:  1. Fall from wheelchair, initial encounter    2. Dementia, unspecified dementia severity, unspecified dementia type, unspecified whether behavioral, psychotic, or mood disturbance or anxiety (HCC)    3. Elevated blood pressure reading      Disposition referral (if applicable):  Francie Melissa MD  [address]  [facility] [address]  [phone]    Schedule an appointment as soon as possible for a visit in 3 days      [facility] Emergency Department  [address] [facility] [address]  [phone]  Go to   If symptoms worsen    Disposition medications (if applicable):  New Prescriptions    No medications on file     ED Provider Disposition Time  DISPOSITION Decision To Discharge 10/04/2024 10:01:14 PM  Condition at Disposition: Data Unavailable        Comment: Please note this report has been produced using speech recognition software and may contain errors related to that system including errors in grammar, punctuation, and spelling, as well as words and phrases that may be inappropriate. If there are any questions or concerns please feel free to contact the dictating provider for clarification.        Shmuel Soto,   10/04/24 1503

## 2024-10-05 NOTE — ED NOTES
Report given to [facility] for transport back to [facility]. Discharge instructions and discharge paperwork given to transport crew.

## (undated) DEVICE — SUTURE VCRL SZ 3-0 L18IN ABSRB UD L26MM SH 1/2 CIR J864D

## (undated) DEVICE — TOWEL,OR,DSP,ST,BLUE,STD,6/PK,12PK/CS: Brand: MEDLINE

## (undated) DEVICE — DRESSING,GAUZE,XEROFORM,CURAD,1"X8",ST: Brand: CURAD

## (undated) DEVICE — INTENDED FOR TISSUE SEPARATION, AND OTHER PROCEDURES THAT REQUIRE A SHARP SURGICAL BLADE TO PUNCTURE OR CUT.: Brand: BARD-PARKER ® STAINLESS STEEL BLADES

## (undated) DEVICE — CORD ES L15FT PT RET REUSE VALLEYLAB REM

## (undated) DEVICE — SNAP KOVER: Brand: UNBRANDED

## (undated) DEVICE — BANDAGE COMPR W4INXL5YD WHT BGE POLY COT M E WRP WV HK AND

## (undated) DEVICE — Z INACTIVE PER PHARM Z INACTIVE USE 2530107 SOLUTION ANTISEP 70% ISOPROPYL RUBBING ALC 16 OZ PLAS BTL

## (undated) DEVICE — MARKER SURG SKIN UTIL REGULAR/FINE 2 TIP W/ RUL AND 9 LBL

## (undated) DEVICE — Device

## (undated) DEVICE — SPONGE GZ W4XL8IN COT WVN 12 PLY

## (undated) DEVICE — PENCIL ES CRD L10FT HND SWCHING ROCK SWCH W/ EDGE COAT BLDE

## (undated) DEVICE — GLOVE ORANGE PI 8   MSG9080

## (undated) DEVICE — ELECTRODE ES AD CRDLSS PT RET REM POLYHESIVE

## (undated) DEVICE — GLOVE SURG SZ 6 THK91MIL LTX FREE SYN POLYISOPRENE ANTI

## (undated) DEVICE — TUBING, SUCTION, 9/32" X 10', STRAIGHT: Brand: MEDLINE

## (undated) DEVICE — GLOVE SURG SZ 65 THK91MIL LTX FREE SYN POLYISOPRENE

## (undated) DEVICE — COTTON UNDERCAST PADDING,CRIMPED FINISH: Brand: WEBRIL

## (undated) DEVICE — GAUZE,SPONGE,8"X4",12PLY,XRAY,STRL,LF: Brand: MEDLINE

## (undated) DEVICE — GLOVE ORANGE PI 7   MSG9070

## (undated) DEVICE — SUTURE ETHLN SZ 3-0 L18IN NONABSORBABLE BLK FS-1 L24MM 3/8 663H

## (undated) DEVICE — SOLUTION IV 1000ML 0.9% SOD CHL FOR IRRIG PLAS CONT

## (undated) DEVICE — SCREW BNE L18MM DIA2.7MM CORT S STL ST T8 STARDRV RECESS
Type: IMPLANTABLE DEVICE | Site: WRIST | Status: NON-FUNCTIONAL
Removed: 2019-08-07

## (undated) DEVICE — BANDAGE,SELF ADHRNT,COFLEX,4"X5YD,STRL: Brand: COLABEL

## (undated) DEVICE — STANDARD HYPODERMIC NEEDLE,POLYPROPYLENE HUB: Brand: MONOJECT

## (undated) DEVICE — PADDING,UNDERCAST,COTTON, 4"X4YD STERILE: Brand: MEDLINE

## (undated) DEVICE — DRAPE,U/ SHT,SPLIT,PLAS,STERIL: Brand: MEDLINE

## (undated) DEVICE — LINER,SEMI-RIGID,3000CC,50EA/CS: Brand: MEDLINE

## (undated) DEVICE — GAUZE,SPONGE,4"X4",16PLY,XRAY,STRL,LF: Brand: MEDLINE

## (undated) DEVICE — TUBING SUCT 12FR MAL ALUM SHFT FN CAP VENT UNIV CONN W/ OBT

## (undated) DEVICE — ANESTHESIA CIRCUIT ADULT-LF: Brand: MEDLINE INDUSTRIES, INC.

## (undated) DEVICE — SPONGE LAP W18XL18IN WHT COT 4 PLY FLD STRUNG RADPQ DISP ST

## (undated) DEVICE — BANDAGE,ELASTIC,ESMARK,STERILE,4"X9',LF: Brand: MEDLINE

## (undated) DEVICE — PACK,BASIC,IX: Brand: MEDLINE

## (undated) DEVICE — COUNTER NDL 30 COUNT FOAM STRP SGL MAG

## (undated) DEVICE — STERILE LATEX POWDER-FREE SURGICAL GLOVESWITH NITRILE COATING: Brand: PROTEXIS

## (undated) DEVICE — BIT DRL L110MM DIA1.8MM QUIK CPL CALIB W/O STP REUSE

## (undated) DEVICE — DRAPE SURGICAL HAND PROX AURORA

## (undated) DEVICE — BANDAGE COMPR W4INXL15FT BGE E SGL LAYERED CLP CLSR

## (undated) DEVICE — SOLUTION IV IRRIG WATER 1000ML POUR BRL 2F7114

## (undated) DEVICE — CHLORAPREP 26ML ORANGE

## (undated) DEVICE — SLING ARM M L1375IN D75IN WHT POLY MESH ENVELOP MTL SIDE

## (undated) DEVICE — 3M™ STERI-DRAPE™ U-DRAPE 1015: Brand: STERI-DRAPE™

## (undated) DEVICE — BIT DRL L100MM DIA2MM QUIK CPL W/O STP REUSE

## (undated) DEVICE — SYRINGE IRRIG 60ML SFT PLIABLE BLB EZ TO GRP 1 HND USE W/

## (undated) DEVICE — LINER SUCT CANSTR 1500CC SEMI RIG W/ POR HYDROPHOBIC SHUT

## (undated) DEVICE — DRAPE SHEET ULTRAGARD: Brand: MEDLINE

## (undated) DEVICE — YANKAUER,FLEXIBLE HANDLE,REGLR CAPACITY: Brand: MEDLINE INDUSTRIES, INC.